# Patient Record
Sex: FEMALE | Race: WHITE | NOT HISPANIC OR LATINO | Employment: FULL TIME | URBAN - METROPOLITAN AREA
[De-identification: names, ages, dates, MRNs, and addresses within clinical notes are randomized per-mention and may not be internally consistent; named-entity substitution may affect disease eponyms.]

---

## 2018-02-05 ENCOUNTER — OFFICE VISIT (OUTPATIENT)
Dept: URGENT CARE | Facility: CLINIC | Age: 38
End: 2018-02-05
Payer: COMMERCIAL

## 2018-02-05 VITALS
TEMPERATURE: 97 F | WEIGHT: 118 LBS | RESPIRATION RATE: 18 BRPM | HEART RATE: 91 BPM | SYSTOLIC BLOOD PRESSURE: 110 MMHG | HEIGHT: 63 IN | DIASTOLIC BLOOD PRESSURE: 62 MMHG | OXYGEN SATURATION: 100 % | BODY MASS INDEX: 20.91 KG/M2

## 2018-02-05 DIAGNOSIS — R05.9 COUGH: ICD-10-CM

## 2018-02-05 DIAGNOSIS — J32.9 RHINOSINUSITIS: Primary | ICD-10-CM

## 2018-02-05 DIAGNOSIS — R10.2 PELVIC PAIN: ICD-10-CM

## 2018-02-05 LAB
BILIRUB UR QL STRIP: NEGATIVE
CTP QC/QA: YES
FLUAV AG NPH QL: NEGATIVE
FLUBV AG NPH QL: NEGATIVE
GLUCOSE UR QL STRIP: NEGATIVE
KETONES UR QL STRIP: NEGATIVE
LEUKOCYTE ESTERASE UR QL STRIP: NEGATIVE
PH, POC UA: 7.5 (ref 5–8)
POC BLOOD, URINE: NEGATIVE
POC NITRATES, URINE: NEGATIVE
PROT UR QL STRIP: NEGATIVE
SP GR UR STRIP: 1.01 (ref 1–1.03)
UROBILINOGEN UR STRIP-ACNC: NORMAL (ref 0.1–1.1)

## 2018-02-05 PROCEDURE — 99203 OFFICE O/P NEW LOW 30 MIN: CPT | Mod: 25,S$GLB,, | Performed by: PHYSICIAN ASSISTANT

## 2018-02-05 PROCEDURE — 81003 URINALYSIS AUTO W/O SCOPE: CPT | Mod: QW,S$GLB,, | Performed by: PHYSICIAN ASSISTANT

## 2018-02-05 PROCEDURE — 87804 INFLUENZA ASSAY W/OPTIC: CPT | Mod: 59,QW,S$GLB, | Performed by: PHYSICIAN ASSISTANT

## 2018-02-05 PROCEDURE — 3008F BODY MASS INDEX DOCD: CPT | Mod: S$GLB,,, | Performed by: PHYSICIAN ASSISTANT

## 2018-02-05 RX ORDER — PREDNISONE 20 MG/1
60 TABLET ORAL DAILY
Qty: 15 TABLET | Refills: 0 | Status: SHIPPED | OUTPATIENT
Start: 2018-02-05 | End: 2018-02-10

## 2018-02-05 RX ORDER — PROMETHAZINE HYDROCHLORIDE AND DEXTROMETHORPHAN HYDROBROMIDE 6.25; 15 MG/5ML; MG/5ML
5 SYRUP ORAL EVERY 6 HOURS PRN
Qty: 120 ML | Refills: 0 | Status: SHIPPED | OUTPATIENT
Start: 2018-02-05 | End: 2018-02-15

## 2018-02-05 NOTE — PROGRESS NOTES
"Subjective:       Patient ID: Irene Payne is a 37 y.o. female.    Vitals:  height is 5' 2.5" (1.588 m) and weight is 53.5 kg (118 lb). Her tympanic temperature is 97.1 °F (36.2 °C). Her blood pressure is 110/62 and her pulse is 91. Her respiration is 18 and oxygen saturation is 100%.     Chief Complaint: URI (4 days ) and Pelvic Pain (1 week left side w/ discharge )    This is a 37 y.o. female with History reviewed. No pertinent past medical history.   who presents today with a chief complaint of uri for 2 months off and on.        URI    This is a new problem. The current episode started more than 1 month ago (2 months off and on ). The problem has been gradually worsening. Associated symptoms include congestion, ear pain (left ), headaches, rhinorrhea and swollen glands. Pertinent negatives include no abdominal pain, chest pain, coughing, nausea, sore throat or wheezing.   Pelvic Pain   The patient's primary symptoms include pelvic pain (left ). This is a new problem. The current episode started in the past 7 days (5 days ). The problem occurs constantly. The problem has been gradually worsening. The pain is moderate. The problem affects the left side. She is not pregnant. Associated symptoms include back pain (upper and lower back ), headaches and urgency. Pertinent negatives include no abdominal pain, chills, fever, nausea or sore throat. Exacerbated by: touch and movement  She has tried acetaminophen for the symptoms. She is sexually active. No, her partner does not have an STD. She uses nothing for contraception. Her menstrual history has been regular.     Review of Systems   Constitution: Positive for malaise/fatigue. Negative for chills and fever.   HENT: Positive for congestion, ear pain (left ), hoarse voice and rhinorrhea. Negative for sore throat.    Eyes: Negative for discharge and redness.   Cardiovascular: Negative for chest pain, dyspnea on exertion and leg swelling.   Respiratory: Negative for " cough, shortness of breath, sputum production and wheezing.    Musculoskeletal: Positive for back pain (upper and lower back ). Negative for myalgias.   Gastrointestinal: Positive for bloating. Negative for abdominal pain and nausea.   Genitourinary: Positive for pelvic pain (left ) and urgency.   Neurological: Positive for dizziness and headaches.       Objective:      Physical Exam   Constitutional: She is oriented to person, place, and time. She appears well-developed and well-nourished.   HENT:   Head: Normocephalic and atraumatic.   Right Ear: Hearing, tympanic membrane, external ear and ear canal normal.   Left Ear: Hearing, tympanic membrane, external ear and ear canal normal.   Nose: Nose normal. Right sinus exhibits no maxillary sinus tenderness and no frontal sinus tenderness. Left sinus exhibits no maxillary sinus tenderness and no frontal sinus tenderness.   Mouth/Throat: Uvula is midline and oropharynx is clear and moist.   Eyes: Conjunctivae are normal.   Neck: Normal range of motion. Neck supple. No thyromegaly present.   Cardiovascular: Normal rate and regular rhythm.  Exam reveals no gallop and no friction rub.    No murmur heard.  Pulmonary/Chest: Effort normal and breath sounds normal. She has no wheezes. She has no rales. She exhibits tenderness (left anterior).   Genitourinary:   Genitourinary Comments: Deferred exam, patient has no concern for STD.   Musculoskeletal: Normal range of motion.        Thoracic back: She exhibits tenderness (left mid/upper back).   Lymphadenopathy:     She has no cervical adenopathy.   Neurological: She is alert and oriented to person, place, and time.   Skin: Skin is warm and dry. No rash noted. No erythema.   Psychiatric: She has a normal mood and affect. Her behavior is normal. Judgment and thought content normal.   Nursing note and vitals reviewed.      3:03 PM - Chest xray show no acute abnormalities.    Assessment:       1. Rhinosinusitis    2. Cough    3.  Pelvic pain        Plan:         Rhinosinusitis    Cough  -     POCT Influenza A/B  -     X-Ray Chest PA And Lateral; Future; Expected date: 02/05/2018  -     predniSONE (DELTASONE) 20 MG tablet; Take 3 tablets (60 mg total) by mouth once daily.  Dispense: 15 tablet; Refill: 0  -     promethazine-dextromethorphan (PROMETHAZINE-DM) 6.25-15 mg/5 mL Syrp; Take 5 mLs by mouth every 6 (six) hours as needed (cough).  Dispense: 120 mL; Refill: 0    Pelvic pain  -     POCT Urinalysis, Dipstick, Automated, W/O Scope      Irene was seen today for uri and pelvic pain.    Diagnoses and all orders for this visit:    Rhinosinusitis    Cough  -     POCT Influenza A/B  -     X-Ray Chest PA And Lateral; Future  -     predniSONE (DELTASONE) 20 MG tablet; Take 3 tablets (60 mg total) by mouth once daily.  -     promethazine-dextromethorphan (PROMETHAZINE-DM) 6.25-15 mg/5 mL Syrp; Take 5 mLs by mouth every 6 (six) hours as needed (cough).    Pelvic pain  -     POCT Urinalysis, Dipstick, Automated, W/O Scope      Patient Instructions   - Rest.    - Drink plenty of fluids.    - Tylenol or Ibuprofen as directed as needed for fever/pain.   - Take over-the-counter claritin, zyrtec, allegra, or xyzal as directed.  - Use over the counter Flonase as directed for sinus congestion and postnasal drip.  - use nasal saline prior to Flonase.  - Antibiotics are not needed at this time.  - Usual course of cold symptom is 10-14 days, but longer if patient is a smoker.   - Use salt water gargle for sore throat.    - Follow up with your PCP or specialty clinic as directed in the next 1-2 weeks if not improved or as needed.  You can call [phone] to schedule an appointment with the appropriate provider.    - Go to the ED if your symptoms worsen.    Sinusitis (No Antibiotics)    The sinuses are air-filled spaces within the bones of the face. They connect to the inside of the nose. Sinusitis is an inflammation of the tissue lining the sinus cavity.  Sinus inflammation can occur during a cold. It can also be due to allergies to pollens and other particles in the air. It can cause symptoms such as sinus congestion, headache, sore throat, facial swelling and fullness. It may also cause a low-grade fever. No infection is present, and no antibiotic treatment is needed.  Home care  · Drink plenty of water, hot tea, and other liquids. This may help thin mucus. It also may promote sinus drainage.  · Heat may help soothe painful areas of the face. Use a towel soaked in hot water. Or,  the shower and direct the hot spray onto your face. Using a vaporizer along with a menthol rub at night may also help.   · An expectorant containing guaifenesin may help thin the mucus and promote drainage from the sinuses.  · Over-the-counter decongestants may be used unless a similar medicine was prescribed. Nasal sprays work the fastest. Use one that contains phenylephrine or oxymetazoline. First blow the nose gently. Then use the spray. Do not use these medicines more often than directed on the label or symptoms may get worse. You may also use tablets containing pseudoephedrine. Avoid products that combine ingredients, because side effects may be increased. Read labels. You can also ask the pharmacist for help. (NOTE: Persons with high blood pressure should not use decongestants. They can raise blood pressure.)  · Over-the-counter antihistamines may help if allergies contributed to your sinusitis.    · Use acetaminophen or ibuprofen to control pain, unless another pain medicine was prescribed. (If you have chronic liver or kidney disease or ever had a stomach ulcer, talk with your doctor before using these medicines. Aspirin should never be used in anyone under 18 years of age who is ill with a fever. It may cause severe liver damage.)  · Use nasal rinses or irrigation as instructed by your health care provider.  · Don't smoke. This can worsen symptoms.  Follow-up care  Follow  up with your healthcare provider or our staff if you are not improving within the next week.  When to seek medical advice  Call your healthcare provider if any of these occur:  · Green or yellow discharge from the nose or into the throat  · Facial pain or headache becoming more severe  · Stiff neck  · Unusual drowsiness or confusion  · Swelling of the forehead or eyelids  · Vision problems, including blurred or double vision  · Fever of 100.4ºF (38ºC) or higher, or as directed by your healthcare provider  · Seizure  · Breathing problems  · Symptoms not resolving within 10 days  Date Last Reviewed: 4/13/2015  © 2766-7355 [employer]. [address] [place] [address], [place] [address]. All rights reserved. This information is not intended as a substitute for professional medical care. Always follow your healthcare professional's instructions.        Viral Upper Respiratory Illness (Adult)  You have a viral upper respiratory illness (URI), which is another term for the common cold. This illness is contagious during the first few days. It is spread through the air by coughing and sneezing. It may also be spread by direct contact (touching the sick person and then touching your own eyes, nose, or mouth). Frequent handwashing will decrease risk of spread. Most viral illnesses go away within 7 to 10 days with rest and simple home remedies. Sometimes the illness may last for several weeks. Antibiotics will not kill a virus, and they are generally not prescribed for this condition.    Home care  · If symptoms are severe, rest at home for the first 2 to 3 days. When you resume activity, don't let yourself get too tired.  · Avoid being exposed to cigarette smoke (yours or others).  · You may use acetaminophen or ibuprofen to control pain and fever, unless another medicine was prescribed. (Note: If you have chronic liver or kidney disease, have ever had a stomach ulcer or gastrointestinal bleeding, or are taking  blood-thinning medicines, talk with your healthcare provider before using these medicines.) Aspirin should never be given to anyone under 18 years of age who is ill with a viral infection or fever. It may cause severe liver or brain damage.  · Your appetite may be poor, so a light diet is fine. Avoid dehydration by drinking 6 to 8 glasses of fluids per day (water, soft drinks, juices, tea, or soup). Extra fluids will help loosen secretions in the nose and lungs.  · Over-the-counter cold medicines will not shorten the length of time youre sick, but they may be helpful for the following symptoms: cough, sore throat, and nasal and sinus congestion. (Note: Do not use decongestants if you have high blood pressure.)  Follow-up care  Follow up with your healthcare provider, or as advised.  When to seek medical advice  Call your healthcare provider right away if any of these occur:  · Cough with lots of colored sputum (mucus)  · Severe headache; face, neck, or ear pain  · Difficulty swallowing due to throat pain  · Fever of 100.4°F (38°C)  Call 911, or get immediate medical care  Call emergency services right away if any of these occur:  · Chest pain, shortness of breath, wheezing, or difficulty breathing  · Coughing up blood  · Inability to swallow due to throat pain  Date Last Reviewed: 9/13/2015  © 1767-8522 [employer]. [address] [place] [address]. All rights reserved. This information is not intended as a substitute for professional medical care. Always follow your healthcare professional's instructions.        Pelvic Pain, Uncertain Cause    Pelvic pain is pain felt in the lowest part of the belly (abdomen) and between the hipbones. The pain may be acute. This means it occurred suddenly and recently. Or the pain may be chronic. This means it has occurred for 6 months or longer.  There are many possible causes of pelvic pain. The pain may be due to a problem in the female reproductive  system (pictured here). Or, it may be due to a problem in the digestive, urinary, or musculoskeletal systems.  Based on your visit today, the exact cause of your pelvic pain is not certain. Your condition does not appear to be serious at this time. But it is important for you to keep watching for any new symptoms or worsening of your condition.  General care  Your healthcare provider may advise a number of ways to help manage your pain. These can include:  · Taking over-the-counter pain medicine. Stronger pain medicine may also be prescribed, if needed.  · Applying heat to the pelvic area. Use a heating pad or a hot pack. Taking a hot bath may also help.  · Getting plenty of rest.  · Making certain lifestyle changes. These can include practicing good posture and getting regular exercise. (Studies have shown that these changes help reduce pelvic pain in some women.)  · Seeing a physical therapist or pain specialist. These healthcare providers can discuss other ways to manage pain with you.  Follow-up care  Follow up with your healthcare provider, or as advised.   When to seek medical advice  Call your healthcare provider right away if any of the following occur:  · Fever of 100.4°F or higher, or as directed by your healthcare provider  · Pain worsens or you have sudden, severe pain or new pain  · Nausea, vomiting, sweating, or restlessness  · Dizziness or fainting  · Unusual vaginal discharge  · Abnormal vaginal bleeding (especially bleeding after menopause)  Date Last Reviewed: 6/11/2015  © 9798-7194 [employer]. [address] [place] [address], [place] [address]. All rights reserved. This information is not intended as a substitute for professional medical care. Always follow your healthcare professional's instructions.

## 2018-02-05 NOTE — PATIENT INSTRUCTIONS
- Rest.    - Drink plenty of fluids.    - Tylenol or Ibuprofen as directed as needed for fever/pain.   - Take over-the-counter claritin, zyrtec, allegra, or xyzal as directed.  - Use over the counter Flonase as directed for sinus congestion and postnasal drip.  - use nasal saline prior to Flonase.  - Antibiotics are not needed at this time.  - Usual course of cold symptom is 10-14 days, but longer if patient is a smoker.   - Use salt water gargle for sore throat.    - Follow up with your PCP or specialty clinic as directed in the next 1-2 weeks if not improved or as needed.  You can call [phone] to schedule an appointment with the appropriate provider.    - Go to the ED if your symptoms worsen.    Sinusitis (No Antibiotics)    The sinuses are air-filled spaces within the bones of the face. They connect to the inside of the nose. Sinusitis is an inflammation of the tissue lining the sinus cavity. Sinus inflammation can occur during a cold. It can also be due to allergies to pollens and other particles in the air. It can cause symptoms such as sinus congestion, headache, sore throat, facial swelling and fullness. It may also cause a low-grade fever. No infection is present, and no antibiotic treatment is needed.  Home care  · Drink plenty of water, hot tea, and other liquids. This may help thin mucus. It also may promote sinus drainage.  · Heat may help soothe painful areas of the face. Use a towel soaked in hot water. Or,  the shower and direct the hot spray onto your face. Using a vaporizer along with a menthol rub at night may also help.   · An expectorant containing guaifenesin may help thin the mucus and promote drainage from the sinuses.  · Over-the-counter decongestants may be used unless a similar medicine was prescribed. Nasal sprays work the fastest. Use one that contains phenylephrine or oxymetazoline. First blow the nose gently. Then use the spray. Do not use these medicines more often than  directed on the label or symptoms may get worse. You may also use tablets containing pseudoephedrine. Avoid products that combine ingredients, because side effects may be increased. Read labels. You can also ask the pharmacist for help. (NOTE: Persons with high blood pressure should not use decongestants. They can raise blood pressure.)  · Over-the-counter antihistamines may help if allergies contributed to your sinusitis.    · Use acetaminophen or ibuprofen to control pain, unless another pain medicine was prescribed. (If you have chronic liver or kidney disease or ever had a stomach ulcer, talk with your doctor before using these medicines. Aspirin should never be used in anyone under 18 years of age who is ill with a fever. It may cause severe liver damage.)  · Use nasal rinses or irrigation as instructed by your health care provider.  · Don't smoke. This can worsen symptoms.  Follow-up care  Follow up with your healthcare provider or our staff if you are not improving within the next week.  When to seek medical advice  Call your healthcare provider if any of these occur:  · Green or yellow discharge from the nose or into the throat  · Facial pain or headache becoming more severe  · Stiff neck  · Unusual drowsiness or confusion  · Swelling of the forehead or eyelids  · Vision problems, including blurred or double vision  · Fever of 100.4ºF (38ºC) or higher, or as directed by your healthcare provider  · Seizure  · Breathing problems  · Symptoms not resolving within 10 days  Date Last Reviewed: 4/13/2015  © 1709-3208 [employer]. [address] [place] [address]. All rights reserved. This information is not intended as a substitute for professional medical care. Always follow your healthcare professional's instructions.        Viral Upper Respiratory Illness (Adult)  You have a viral upper respiratory illness (URI), which is another term for the common cold. This illness is contagious during  the first few days. It is spread through the air by coughing and sneezing. It may also be spread by direct contact (touching the sick person and then touching your own eyes, nose, or mouth). Frequent handwashing will decrease risk of spread. Most viral illnesses go away within 7 to 10 days with rest and simple home remedies. Sometimes the illness may last for several weeks. Antibiotics will not kill a virus, and they are generally not prescribed for this condition.    Home care  · If symptoms are severe, rest at home for the first 2 to 3 days. When you resume activity, don't let yourself get too tired.  · Avoid being exposed to cigarette smoke (yours or others).  · You may use acetaminophen or ibuprofen to control pain and fever, unless another medicine was prescribed. (Note: If you have chronic liver or kidney disease, have ever had a stomach ulcer or gastrointestinal bleeding, or are taking blood-thinning medicines, talk with your healthcare provider before using these medicines.) Aspirin should never be given to anyone under 18 years of age who is ill with a viral infection or fever. It may cause severe liver or brain damage.  · Your appetite may be poor, so a light diet is fine. Avoid dehydration by drinking 6 to 8 glasses of fluids per day (water, soft drinks, juices, tea, or soup). Extra fluids will help loosen secretions in the nose and lungs.  · Over-the-counter cold medicines will not shorten the length of time youre sick, but they may be helpful for the following symptoms: cough, sore throat, and nasal and sinus congestion. (Note: Do not use decongestants if you have high blood pressure.)  Follow-up care  Follow up with your healthcare provider, or as advised.  When to seek medical advice  Call your healthcare provider right away if any of these occur:  · Cough with lots of colored sputum (mucus)  · Severe headache; face, neck, or ear pain  · Difficulty swallowing due to throat pain  · Fever of 100.4°F  (38°C)  Call 911, or get immediate medical care  Call emergency services right away if any of these occur:  · Chest pain, shortness of breath, wheezing, or difficulty breathing  · Coughing up blood  · Inability to swallow due to throat pain  Date Last Reviewed: 9/13/2015  © 3521-9877 [employer]. [address] [place] [address], [place] [address]. All rights reserved. This information is not intended as a substitute for professional medical care. Always follow your healthcare professional's instructions.        Pelvic Pain, Uncertain Cause    Pelvic pain is pain felt in the lowest part of the belly (abdomen) and between the hipbones. The pain may be acute. This means it occurred suddenly and recently. Or the pain may be chronic. This means it has occurred for 6 months or longer.  There are many possible causes of pelvic pain. The pain may be due to a problem in the female reproductive system (pictured here). Or, it may be due to a problem in the digestive, urinary, or musculoskeletal systems.  Based on your visit today, the exact cause of your pelvic pain is not certain. Your condition does not appear to be serious at this time. But it is important for you to keep watching for any new symptoms or worsening of your condition.  General care  Your healthcare provider may advise a number of ways to help manage your pain. These can include:  · Taking over-the-counter pain medicine. Stronger pain medicine may also be prescribed, if needed.  · Applying heat to the pelvic area. Use a heating pad or a hot pack. Taking a hot bath may also help.  · Getting plenty of rest.  · Making certain lifestyle changes. These can include practicing good posture and getting regular exercise. (Studies have shown that these changes help reduce pelvic pain in some women.)  · Seeing a physical therapist or pain specialist. These healthcare providers can discuss other ways to manage pain with you.  Follow-up care  Follow up with your  healthcare provider, or as advised.   When to seek medical advice  Call your healthcare provider right away if any of the following occur:  · Fever of 100.4°F or higher, or as directed by your healthcare provider  · Pain worsens or you have sudden, severe pain or new pain  · Nausea, vomiting, sweating, or restlessness  · Dizziness or fainting  · Unusual vaginal discharge  · Abnormal vaginal bleeding (especially bleeding after menopause)  Date Last Reviewed: 6/11/2015 © 2000-2017 [employer]. [address] [place] [address]. All rights reserved. This information is not intended as a substitute for professional medical care. Always follow your healthcare professional's instructions.

## 2020-06-23 ENCOUNTER — LAB VISIT (OUTPATIENT)
Dept: PRIMARY CARE CLINIC | Facility: OTHER | Age: 40
End: 2020-06-23
Payer: OTHER GOVERNMENT

## 2020-06-23 DIAGNOSIS — R06.02 SHORTNESS OF BREATH: ICD-10-CM

## 2020-06-23 DIAGNOSIS — Z03.818 ENCOUNTER FOR OBSERVATION FOR SUSPECTED EXPOSURE TO OTHER BIOLOGICAL AGENTS RULED OUT: ICD-10-CM

## 2020-06-23 DIAGNOSIS — R51.9 HEAD ACHE: ICD-10-CM

## 2020-06-23 DIAGNOSIS — M79.10 MUSCLE PAIN: ICD-10-CM

## 2020-06-23 DIAGNOSIS — R50.9 FEVER: ICD-10-CM

## 2020-06-23 DIAGNOSIS — R05.9 COUGH: ICD-10-CM

## 2020-06-23 PROCEDURE — U0003 INFECTIOUS AGENT DETECTION BY NUCLEIC ACID (DNA OR RNA); SEVERE ACUTE RESPIRATORY SYNDROME CORONAVIRUS 2 (SARS-COV-2) (CORONAVIRUS DISEASE [COVID-19]), AMPLIFIED PROBE TECHNIQUE, MAKING USE OF HIGH THROUGHPUT TECHNOLOGIES AS DESCRIBED BY CMS-2020-01-R: HCPCS

## 2020-06-26 LAB — SARS-COV-2 RNA RESP QL NAA+PROBE: NOT DETECTED

## 2020-12-10 ENCOUNTER — LAB VISIT (OUTPATIENT)
Dept: PRIMARY CARE CLINIC | Facility: OTHER | Age: 40
End: 2020-12-10
Attending: INTERNAL MEDICINE
Payer: OTHER GOVERNMENT

## 2020-12-10 DIAGNOSIS — R53.83 FATIGUE: ICD-10-CM

## 2020-12-10 DIAGNOSIS — R51.9 HEAD ACHE: ICD-10-CM

## 2020-12-10 DIAGNOSIS — R42 DIZZINESS: ICD-10-CM

## 2020-12-10 DIAGNOSIS — Z03.818 ENCOUNTER FOR OBSERVATION FOR SUSPECTED EXPOSURE TO OTHER BIOLOGICAL AGENTS RULED OUT: ICD-10-CM

## 2020-12-10 PROCEDURE — U0003 INFECTIOUS AGENT DETECTION BY NUCLEIC ACID (DNA OR RNA); SEVERE ACUTE RESPIRATORY SYNDROME CORONAVIRUS 2 (SARS-COV-2) (CORONAVIRUS DISEASE [COVID-19]), AMPLIFIED PROBE TECHNIQUE, MAKING USE OF HIGH THROUGHPUT TECHNOLOGIES AS DESCRIBED BY CMS-2020-01-R: HCPCS

## 2020-12-12 LAB — SARS-COV-2 RNA RESP QL NAA+PROBE: NOT DETECTED

## 2021-01-04 ENCOUNTER — LAB VISIT (OUTPATIENT)
Dept: PRIMARY CARE CLINIC | Facility: OTHER | Age: 41
End: 2021-01-04
Attending: INTERNAL MEDICINE
Payer: OTHER GOVERNMENT

## 2021-01-04 DIAGNOSIS — Z03.818 ENCOUNTER FOR OBSERVATION FOR SUSPECTED EXPOSURE TO OTHER BIOLOGICAL AGENTS RULED OUT: ICD-10-CM

## 2021-01-04 DIAGNOSIS — R51.9 HEAD ACHE: ICD-10-CM

## 2021-01-04 DIAGNOSIS — R53.83 FATIGUE: ICD-10-CM

## 2021-01-04 PROCEDURE — U0003 INFECTIOUS AGENT DETECTION BY NUCLEIC ACID (DNA OR RNA); SEVERE ACUTE RESPIRATORY SYNDROME CORONAVIRUS 2 (SARS-COV-2) (CORONAVIRUS DISEASE [COVID-19]), AMPLIFIED PROBE TECHNIQUE, MAKING USE OF HIGH THROUGHPUT TECHNOLOGIES AS DESCRIBED BY CMS-2020-01-R: HCPCS

## 2021-01-05 LAB — SARS-COV-2 RNA RESP QL NAA+PROBE: NOT DETECTED

## 2021-04-16 ENCOUNTER — PATIENT MESSAGE (OUTPATIENT)
Dept: RESEARCH | Facility: HOSPITAL | Age: 41
End: 2021-04-16

## 2022-11-06 ENCOUNTER — HOSPITAL ENCOUNTER (INPATIENT)
Facility: HOSPITAL | Age: 42
LOS: 2 days | Discharge: HOME | DRG: 871 | End: 2022-11-09
Attending: STUDENT IN AN ORGANIZED HEALTH CARE EDUCATION/TRAINING PROGRAM | Admitting: STUDENT IN AN ORGANIZED HEALTH CARE EDUCATION/TRAINING PROGRAM

## 2022-11-06 DIAGNOSIS — R07.9 CHEST PAIN: ICD-10-CM

## 2022-11-06 DIAGNOSIS — R50.9 FEVER: ICD-10-CM

## 2022-11-06 DIAGNOSIS — J18.9 PNEUMONIA: ICD-10-CM

## 2022-11-06 DIAGNOSIS — J09.X1 INFLUENZA A WITH PNEUMONIA: Primary | ICD-10-CM

## 2022-11-06 PROBLEM — E87.1 HYPONATREMIA: Status: ACTIVE | Noted: 2022-11-06

## 2022-11-06 PROBLEM — F32.A DEPRESSION: Status: ACTIVE | Noted: 2022-11-06

## 2022-11-06 PROBLEM — A41.9 SEPSIS: Status: ACTIVE | Noted: 2022-11-06

## 2022-11-06 LAB
ALBUMIN SERPL BCP-MCNC: 3.5 G/DL (ref 3.5–5.2)
ALP SERPL-CCNC: 66 U/L (ref 55–135)
ALT SERPL W/O P-5'-P-CCNC: 16 U/L (ref 10–44)
ANION GAP SERPL CALC-SCNC: 13 MMOL/L (ref 8–16)
AST SERPL-CCNC: 15 U/L (ref 10–40)
B-HCG UR QL: NEGATIVE
BACTERIA #/AREA URNS AUTO: NORMAL /HPF
BASOPHILS # BLD AUTO: 0.03 K/UL (ref 0–0.2)
BASOPHILS NFR BLD: 0.2 % (ref 0–1.9)
BILIRUB SERPL-MCNC: 0.5 MG/DL (ref 0.1–1)
BILIRUB UR QL STRIP: NEGATIVE
BNP SERPL-MCNC: 17 PG/ML (ref 0–99)
BUN SERPL-MCNC: 7 MG/DL (ref 6–20)
CALCIUM SERPL-MCNC: 8.7 MG/DL (ref 8.7–10.5)
CHLORIDE SERPL-SCNC: 100 MMOL/L (ref 95–110)
CLARITY UR REFRACT.AUTO: CLEAR
CO2 SERPL-SCNC: 20 MMOL/L (ref 23–29)
COLOR UR AUTO: YELLOW
CREAT SERPL-MCNC: 0.7 MG/DL (ref 0.5–1.4)
CTP QC/QA: YES
DIFFERENTIAL METHOD: ABNORMAL
EOSINOPHIL # BLD AUTO: 0 K/UL (ref 0–0.5)
EOSINOPHIL NFR BLD: 0 % (ref 0–8)
ERYTHROCYTE [DISTWIDTH] IN BLOOD BY AUTOMATED COUNT: 11.9 % (ref 11.5–14.5)
EST. GFR  (NO RACE VARIABLE): >60 ML/MIN/1.73 M^2
GLUCOSE SERPL-MCNC: 107 MG/DL (ref 70–110)
GLUCOSE UR QL STRIP: NEGATIVE
GROUP A STREP, MOLECULAR: NEGATIVE
HCT VFR BLD AUTO: 37.6 % (ref 37–48.5)
HCV AB SERPL QL IA: NORMAL
HGB BLD-MCNC: 12.7 G/DL (ref 12–16)
HGB UR QL STRIP: ABNORMAL
HIV 1+2 AB+HIV1 P24 AG SERPL QL IA: NORMAL
IMM GRANULOCYTES # BLD AUTO: 0.07 K/UL (ref 0–0.04)
IMM GRANULOCYTES NFR BLD AUTO: 0.5 % (ref 0–0.5)
INFLUENZA A, MOLECULAR: DETECTED
INFLUENZA B, MOLECULAR: NOT DETECTED
KETONES UR QL STRIP: ABNORMAL
LACTATE SERPL-SCNC: 0.9 MMOL/L (ref 0.5–2.2)
LACTATE SERPL-SCNC: 1 MMOL/L (ref 0.5–2.2)
LEUKOCYTE ESTERASE UR QL STRIP: NEGATIVE
LIPASE SERPL-CCNC: 7 U/L (ref 4–60)
LYMPHOCYTES # BLD AUTO: 0.9 K/UL (ref 1–4.8)
LYMPHOCYTES NFR BLD: 6.9 % (ref 18–48)
MAGNESIUM SERPL-MCNC: 1.4 MG/DL (ref 1.6–2.6)
MCH RBC QN AUTO: 29.4 PG (ref 27–31)
MCHC RBC AUTO-ENTMCNC: 33.8 G/DL (ref 32–36)
MCV RBC AUTO: 87 FL (ref 82–98)
MICROSCOPIC COMMENT: NORMAL
MONOCYTES # BLD AUTO: 1 K/UL (ref 0.3–1)
MONOCYTES NFR BLD: 7.3 % (ref 4–15)
NEUTROPHILS # BLD AUTO: 11.4 K/UL (ref 1.8–7.7)
NEUTROPHILS NFR BLD: 85.1 % (ref 38–73)
NITRITE UR QL STRIP: NEGATIVE
NRBC BLD-RTO: 0 /100 WBC
PH UR STRIP: 6 [PH] (ref 5–8)
PHOSPHATE SERPL-MCNC: 3.1 MG/DL (ref 2.7–4.5)
PLATELET # BLD AUTO: 264 K/UL (ref 150–450)
PMV BLD AUTO: 9.8 FL (ref 9.2–12.9)
POTASSIUM SERPL-SCNC: 3.7 MMOL/L (ref 3.5–5.1)
PROCALCITONIN SERPL IA-MCNC: 0.12 NG/ML
PROT SERPL-MCNC: 7.2 G/DL (ref 6–8.4)
PROT UR QL STRIP: NEGATIVE
RBC # BLD AUTO: 4.32 M/UL (ref 4–5.4)
RBC #/AREA URNS AUTO: 3 /HPF (ref 0–4)
RSV AG BY MOLECULAR METHOD: NOT DETECTED
SARS-COV-2 RNA RESP QL NAA+PROBE: NOT DETECTED
SODIUM SERPL-SCNC: 133 MMOL/L (ref 136–145)
SP GR UR STRIP: 1 (ref 1–1.03)
SQUAMOUS #/AREA URNS AUTO: 4 /HPF
TROPONIN I SERPL DL<=0.01 NG/ML-MCNC: 0.02 NG/ML (ref 0–0.03)
URN SPEC COLLECT METH UR: ABNORMAL
WBC # BLD AUTO: 13.37 K/UL (ref 3.9–12.7)
WBC #/AREA URNS AUTO: 3 /HPF (ref 0–5)

## 2022-11-06 PROCEDURE — 83690 ASSAY OF LIPASE: CPT | Performed by: STUDENT IN AN ORGANIZED HEALTH CARE EDUCATION/TRAINING PROGRAM

## 2022-11-06 PROCEDURE — 0241U SARS-COV2 (COVID) WITH FLU/RSV BY PCR: CPT | Performed by: STUDENT IN AN ORGANIZED HEALTH CARE EDUCATION/TRAINING PROGRAM

## 2022-11-06 PROCEDURE — 81001 URINALYSIS AUTO W/SCOPE: CPT | Performed by: STUDENT IN AN ORGANIZED HEALTH CARE EDUCATION/TRAINING PROGRAM

## 2022-11-06 PROCEDURE — 86803 HEPATITIS C AB TEST: CPT | Performed by: PHYSICIAN ASSISTANT

## 2022-11-06 PROCEDURE — 80053 COMPREHEN METABOLIC PANEL: CPT | Performed by: STUDENT IN AN ORGANIZED HEALTH CARE EDUCATION/TRAINING PROGRAM

## 2022-11-06 PROCEDURE — 81025 URINE PREGNANCY TEST: CPT | Performed by: STUDENT IN AN ORGANIZED HEALTH CARE EDUCATION/TRAINING PROGRAM

## 2022-11-06 PROCEDURE — 63600175 PHARM REV CODE 636 W HCPCS: Performed by: STUDENT IN AN ORGANIZED HEALTH CARE EDUCATION/TRAINING PROGRAM

## 2022-11-06 PROCEDURE — G0378 HOSPITAL OBSERVATION PER HR: HCPCS

## 2022-11-06 PROCEDURE — 99223 PR INITIAL HOSPITAL CARE,LEVL III: ICD-10-PCS | Mod: ,,, | Performed by: STUDENT IN AN ORGANIZED HEALTH CARE EDUCATION/TRAINING PROGRAM

## 2022-11-06 PROCEDURE — 84484 ASSAY OF TROPONIN QUANT: CPT | Performed by: STUDENT IN AN ORGANIZED HEALTH CARE EDUCATION/TRAINING PROGRAM

## 2022-11-06 PROCEDURE — 93005 ELECTROCARDIOGRAM TRACING: CPT

## 2022-11-06 PROCEDURE — 84145 PROCALCITONIN (PCT): CPT | Performed by: STUDENT IN AN ORGANIZED HEALTH CARE EDUCATION/TRAINING PROGRAM

## 2022-11-06 PROCEDURE — 96365 THER/PROPH/DIAG IV INF INIT: CPT | Mod: 59

## 2022-11-06 PROCEDURE — 96361 HYDRATE IV INFUSION ADD-ON: CPT

## 2022-11-06 PROCEDURE — 99291 PR CRITICAL CARE, E/M 30-74 MINUTES: ICD-10-PCS | Mod: CS,,, | Performed by: STUDENT IN AN ORGANIZED HEALTH CARE EDUCATION/TRAINING PROGRAM

## 2022-11-06 PROCEDURE — 96367 TX/PROPH/DG ADDL SEQ IV INF: CPT

## 2022-11-06 PROCEDURE — 87150 DNA/RNA AMPLIFIED PROBE: CPT | Performed by: STUDENT IN AN ORGANIZED HEALTH CARE EDUCATION/TRAINING PROGRAM

## 2022-11-06 PROCEDURE — 87449 NOS EACH ORGANISM AG IA: CPT

## 2022-11-06 PROCEDURE — 25000003 PHARM REV CODE 250: Performed by: STUDENT IN AN ORGANIZED HEALTH CARE EDUCATION/TRAINING PROGRAM

## 2022-11-06 PROCEDURE — 84100 ASSAY OF PHOSPHORUS: CPT | Performed by: STUDENT IN AN ORGANIZED HEALTH CARE EDUCATION/TRAINING PROGRAM

## 2022-11-06 PROCEDURE — 83880 ASSAY OF NATRIURETIC PEPTIDE: CPT | Performed by: STUDENT IN AN ORGANIZED HEALTH CARE EDUCATION/TRAINING PROGRAM

## 2022-11-06 PROCEDURE — 85025 COMPLETE CBC W/AUTO DIFF WBC: CPT | Performed by: STUDENT IN AN ORGANIZED HEALTH CARE EDUCATION/TRAINING PROGRAM

## 2022-11-06 PROCEDURE — 25500020 PHARM REV CODE 255: Performed by: STUDENT IN AN ORGANIZED HEALTH CARE EDUCATION/TRAINING PROGRAM

## 2022-11-06 PROCEDURE — 83735 ASSAY OF MAGNESIUM: CPT | Performed by: STUDENT IN AN ORGANIZED HEALTH CARE EDUCATION/TRAINING PROGRAM

## 2022-11-06 PROCEDURE — 25000003 PHARM REV CODE 250

## 2022-11-06 PROCEDURE — 96375 TX/PRO/DX INJ NEW DRUG ADDON: CPT

## 2022-11-06 PROCEDURE — 99223 1ST HOSP IP/OBS HIGH 75: CPT | Mod: ,,, | Performed by: STUDENT IN AN ORGANIZED HEALTH CARE EDUCATION/TRAINING PROGRAM

## 2022-11-06 PROCEDURE — 96366 THER/PROPH/DIAG IV INF ADDON: CPT

## 2022-11-06 PROCEDURE — 87651 STREP A DNA AMP PROBE: CPT | Performed by: STUDENT IN AN ORGANIZED HEALTH CARE EDUCATION/TRAINING PROGRAM

## 2022-11-06 PROCEDURE — 99291 CRITICAL CARE FIRST HOUR: CPT | Mod: CS,,, | Performed by: STUDENT IN AN ORGANIZED HEALTH CARE EDUCATION/TRAINING PROGRAM

## 2022-11-06 PROCEDURE — 87389 HIV-1 AG W/HIV-1&-2 AB AG IA: CPT | Performed by: PHYSICIAN ASSISTANT

## 2022-11-06 PROCEDURE — 93010 ELECTROCARDIOGRAM REPORT: CPT | Mod: ,,, | Performed by: INTERNAL MEDICINE

## 2022-11-06 PROCEDURE — 93010 EKG 12-LEAD: ICD-10-PCS | Mod: ,,, | Performed by: INTERNAL MEDICINE

## 2022-11-06 PROCEDURE — 83605 ASSAY OF LACTIC ACID: CPT | Performed by: STUDENT IN AN ORGANIZED HEALTH CARE EDUCATION/TRAINING PROGRAM

## 2022-11-06 PROCEDURE — 99285 EMERGENCY DEPT VISIT HI MDM: CPT | Mod: 25

## 2022-11-06 PROCEDURE — 63700000 PHARM REV CODE 250 ALT 637 W/O HCPCS

## 2022-11-06 PROCEDURE — 87040 BLOOD CULTURE FOR BACTERIA: CPT | Mod: 59 | Performed by: STUDENT IN AN ORGANIZED HEALTH CARE EDUCATION/TRAINING PROGRAM

## 2022-11-06 RX ORDER — TALC
6 POWDER (GRAM) TOPICAL NIGHTLY PRN
Status: DISCONTINUED | OUTPATIENT
Start: 2022-11-06 | End: 2022-11-09 | Stop reason: HOSPADM

## 2022-11-06 RX ORDER — ACETAMINOPHEN 325 MG/1
650 TABLET ORAL EVERY 4 HOURS PRN
Status: DISCONTINUED | OUTPATIENT
Start: 2022-11-06 | End: 2022-11-06

## 2022-11-06 RX ORDER — SODIUM CHLORIDE 0.9 % (FLUSH) 0.9 %
10 SYRINGE (ML) INJECTION EVERY 12 HOURS PRN
Status: DISCONTINUED | OUTPATIENT
Start: 2022-11-06 | End: 2022-11-09 | Stop reason: HOSPADM

## 2022-11-06 RX ORDER — PROMETHAZINE HYDROCHLORIDE AND CODEINE PHOSPHATE 6.25; 1 MG/5ML; MG/5ML
5 SOLUTION ORAL
Status: COMPLETED | OUTPATIENT
Start: 2022-11-06 | End: 2022-11-06

## 2022-11-06 RX ORDER — ACETAMINOPHEN 500 MG
1000 TABLET ORAL
Status: COMPLETED | OUTPATIENT
Start: 2022-11-06 | End: 2022-11-06

## 2022-11-06 RX ORDER — BENZONATATE 100 MG/1
100 CAPSULE ORAL 3 TIMES DAILY PRN
Status: DISCONTINUED | OUTPATIENT
Start: 2022-11-06 | End: 2022-11-09 | Stop reason: HOSPADM

## 2022-11-06 RX ORDER — ONDANSETRON 2 MG/ML
4 INJECTION INTRAMUSCULAR; INTRAVENOUS EVERY 8 HOURS PRN
Status: DISCONTINUED | OUTPATIENT
Start: 2022-11-06 | End: 2022-11-09 | Stop reason: HOSPADM

## 2022-11-06 RX ORDER — AZITHROMYCIN 250 MG/1
500 TABLET, FILM COATED ORAL DAILY
Status: COMPLETED | OUTPATIENT
Start: 2022-11-06 | End: 2022-11-08

## 2022-11-06 RX ORDER — MAGNESIUM SULFATE HEPTAHYDRATE 40 MG/ML
2 INJECTION, SOLUTION INTRAVENOUS
Status: COMPLETED | OUTPATIENT
Start: 2022-11-06 | End: 2022-11-06

## 2022-11-06 RX ORDER — OXYCODONE HYDROCHLORIDE 5 MG/1
5 TABLET ORAL EVERY 8 HOURS PRN
Status: DISCONTINUED | OUTPATIENT
Start: 2022-11-06 | End: 2022-11-09 | Stop reason: HOSPADM

## 2022-11-06 RX ORDER — MORPHINE SULFATE 4 MG/ML
4 INJECTION, SOLUTION INTRAMUSCULAR; INTRAVENOUS
Status: COMPLETED | OUTPATIENT
Start: 2022-11-06 | End: 2022-11-06

## 2022-11-06 RX ORDER — HYDROMORPHONE HYDROCHLORIDE 1 MG/ML
1 INJECTION, SOLUTION INTRAMUSCULAR; INTRAVENOUS; SUBCUTANEOUS
Status: DISCONTINUED | OUTPATIENT
Start: 2022-11-06 | End: 2022-11-06

## 2022-11-06 RX ORDER — ACETAMINOPHEN 325 MG/1
650 TABLET ORAL EVERY 4 HOURS PRN
Status: DISCONTINUED | OUTPATIENT
Start: 2022-11-06 | End: 2022-11-09 | Stop reason: HOSPADM

## 2022-11-06 RX ORDER — OSELTAMIVIR PHOSPHATE 75 MG/1
75 CAPSULE ORAL 2 TIMES DAILY
Status: DISCONTINUED | OUTPATIENT
Start: 2022-11-06 | End: 2022-11-08

## 2022-11-06 RX ORDER — IBUPROFEN 200 MG
16 TABLET ORAL
Status: DISCONTINUED | OUTPATIENT
Start: 2022-11-06 | End: 2022-11-09 | Stop reason: HOSPADM

## 2022-11-06 RX ORDER — PROCHLORPERAZINE EDISYLATE 5 MG/ML
5 INJECTION INTRAMUSCULAR; INTRAVENOUS EVERY 6 HOURS PRN
Status: DISCONTINUED | OUTPATIENT
Start: 2022-11-06 | End: 2022-11-06

## 2022-11-06 RX ORDER — PROCHLORPERAZINE MALEATE 5 MG
5 TABLET ORAL 3 TIMES DAILY PRN
Status: DISCONTINUED | OUTPATIENT
Start: 2022-11-06 | End: 2022-11-09 | Stop reason: HOSPADM

## 2022-11-06 RX ORDER — NALOXONE HCL 0.4 MG/ML
0.02 VIAL (ML) INJECTION
Status: DISCONTINUED | OUTPATIENT
Start: 2022-11-06 | End: 2022-11-09 | Stop reason: HOSPADM

## 2022-11-06 RX ORDER — IBUPROFEN 200 MG
24 TABLET ORAL
Status: DISCONTINUED | OUTPATIENT
Start: 2022-11-06 | End: 2022-11-09 | Stop reason: HOSPADM

## 2022-11-06 RX ORDER — VANCOMYCIN HCL IN 5 % DEXTROSE 1G/250ML
1000 PLASTIC BAG, INJECTION (ML) INTRAVENOUS
Status: DISCONTINUED | OUTPATIENT
Start: 2022-11-07 | End: 2022-11-06

## 2022-11-06 RX ORDER — GLUCAGON 1 MG
1 KIT INJECTION
Status: DISCONTINUED | OUTPATIENT
Start: 2022-11-06 | End: 2022-11-09 | Stop reason: HOSPADM

## 2022-11-06 RX ORDER — MAGNESIUM SULFATE 1 G/100ML
1 INJECTION INTRAVENOUS
Status: DISCONTINUED | OUTPATIENT
Start: 2022-11-06 | End: 2022-11-06

## 2022-11-06 RX ADMIN — MAGNESIUM SULFATE 2 G: 2 INJECTION INTRAVENOUS at 04:11

## 2022-11-06 RX ADMIN — PIPERACILLIN SODIUM AND TAZOBACTAM SODIUM 4.5 G: 4; .5 INJECTION, POWDER, LYOPHILIZED, FOR SOLUTION INTRAVENOUS at 12:11

## 2022-11-06 RX ADMIN — VANCOMYCIN HYDROCHLORIDE 1250 MG: 1.25 INJECTION, POWDER, LYOPHILIZED, FOR SOLUTION INTRAVENOUS at 12:11

## 2022-11-06 RX ADMIN — PROMETHAZINE HYDROCHLORIDE AND CODEINE PHOSPHATE 5 ML: 6.25; 1 SOLUTION ORAL at 04:11

## 2022-11-06 RX ADMIN — MORPHINE SULFATE 4 MG: 4 INJECTION, SOLUTION INTRAMUSCULAR; INTRAVENOUS at 11:11

## 2022-11-06 RX ADMIN — SODIUM CHLORIDE, SODIUM LACTATE, POTASSIUM CHLORIDE, AND CALCIUM CHLORIDE 2000 ML: .6; .31; .03; .02 INJECTION, SOLUTION INTRAVENOUS at 12:11

## 2022-11-06 RX ADMIN — AZITHROMYCIN MONOHYDRATE 500 MG: 250 TABLET ORAL at 03:11

## 2022-11-06 RX ADMIN — CEFTRIAXONE 2 G: 2 INJECTION, SOLUTION INTRAVENOUS at 04:11

## 2022-11-06 RX ADMIN — ACETAMINOPHEN 1000 MG: 500 TABLET ORAL at 11:11

## 2022-11-06 RX ADMIN — OXYCODONE 5 MG: 5 TABLET ORAL at 07:11

## 2022-11-06 RX ADMIN — IOHEXOL 75 ML: 350 INJECTION, SOLUTION INTRAVENOUS at 01:11

## 2022-11-06 NOTE — HPI
Ms. Merino is a 42 y.o. F with PMH depression, gestational HTN who presents to the ED with fevers, chills, cough, nausea/vomiting. She reports that on 10/25, she woke with dizziness, fevers/chills, and nausea. She had some associated vomiting and diarrhea. Her symptoms started to improve, but then on Thursday 11/3 she developed cough, pleuritic chest pain, and feeling of 'drowning' in mucus. She also reports a temporal headache that feels like throbbing/stabbing pain when she coughs.    The patient has a son who tested positive for influenza a few days after patient's symptoms started. She reports no sick contacts prior to onset of her symptoms. She denies any episodes of aspiration. She reports having COVID-19 in January 2021. She has a remote 6-pack-year smoking history, quit ~20 years ago. No personal or family history of autoimmune dz or vasculitides.      In the ED, patient is febrile to 102.6, tachycardic to 151, /70, RR 28, saturating 97% on room air. Labs notable for leukocytosis to 13.3, hyponatremia to 133, lactate of 1.0. CXR shows opacity of R cardiophrenic angle. CT A/P shows consolidation of the RLL and an incidental L pleural nodule of 3mm. CTH with no acute abnormality. Patient received 2L LR, 1x vanc/zosyn in the ED. Admitted to medicine for further management.

## 2022-11-06 NOTE — ED NOTES
Pt identifiers Irene LEONARDO were checked and are correct  LOC: The patient is awake, alert, aware of environment with an appropriate affect. Oriented x, speaking appropriately  APPEARANCE: Pt rates all over body aches a 10/10 , in no acute distress, pt is clean and well groomed, clothing properly fastened  SKIN: Skin warm, dry and intact, normal skin turgor, moist mucus membranes  RESPIRATORY: Airway is open and patent, respirations are spontaneous, even and unlabored, normal effort and rate Crackles auscultated to upper and lower lung fields   CARDIAC: Normal rate and rhythm, no peripheral edema noted, capillary refill < 3 seconds, bilateral radial pulses 2+  ABDOMEN: Soft, nontender, nondistended. Bowel sounds present to all four quad of abd on auscultation  NEUROLOGIC: PERRL, facial expression is symmetrical, patient moving all extremities spontaneously, normal sensation in all extremities when touched with a finger.  Follows all commands appropriately  MUSCULOSKELETAL: No obvious deformities.

## 2022-11-06 NOTE — PROGRESS NOTES
Pharmacokinetic Assessment Follow Up: IV Vancomycin    Vancomycin discontinued.  Pharmacy will sign off as consult.  Please re-order consult if vancomycin reinitiated.    Thank you for the consult,   Nate Henry

## 2022-11-06 NOTE — ASSESSMENT & PLAN NOTE
Patient with acute onset productive cough following flu-like illness, associated with N/V/D. RLL consolidation on imaging, leukocytosis, hyponatremia.    -continue broad spectrum abx  -f/u resp and blood cx  -see 'sepsis'

## 2022-11-06 NOTE — SUBJECTIVE & OBJECTIVE
Past Medical History:   Diagnosis Date    Gestational HTN        Past Surgical History:   Procedure Laterality Date    FOOT SURGERY Left        Review of patient's allergies indicates:  No Known Allergies    No current facility-administered medications on file prior to encounter.     No current outpatient medications on file prior to encounter.     Family History       Problem Relation (Age of Onset)    No Known Problems Son          Tobacco Use    Smoking status: Former     Packs/day: 1.00     Years: 6.00     Pack years: 6.00     Types: Cigarettes    Smokeless tobacco: Never   Substance and Sexual Activity    Alcohol use: Yes     Comment: social    Drug use: Never    Sexual activity: Yes     Review of Systems   Constitutional:  Positive for chills, diaphoresis and fever.   HENT:  Positive for congestion, sinus pressure (behind eyes) and sore throat. Negative for rhinorrhea.    Eyes:  Negative for photophobia.   Respiratory:  Positive for cough. Negative for shortness of breath.    Cardiovascular:  Negative for chest pain and leg swelling.   Gastrointestinal:  Positive for diarrhea, nausea and vomiting.   Genitourinary:  Negative for dysuria.   Musculoskeletal:  Positive for myalgias. Negative for arthralgias.   Skin:  Negative for rash.   Neurological:  Positive for headaches. Negative for light-headedness.   Psychiatric/Behavioral:  Negative for agitation and confusion.    Objective:     Vital Signs (Most Recent):  Temp: 99.3 °F (37.4 °C) (11/06/22 1418)  Pulse: 90 (11/06/22 1418)  Resp: (!) 21 (11/06/22 1418)  BP: 112/76 (11/06/22 1418)  SpO2: 97 % (11/06/22 1418)   Vital Signs (24h Range):  Temp:  [99.3 °F (37.4 °C)-102.6 °F (39.2 °C)] 99.3 °F (37.4 °C)  Pulse:  [] 90  Resp:  [15-32] 21  SpO2:  [94 %-97 %] 97 %  BP: (112-157)/(70-85) 112/76     Weight: 63.5 kg (140 lb)  Body mass index is 25.61 kg/m².    Physical Exam  Constitutional:       Appearance: Normal appearance.   HENT:      Head: Normocephalic  and atraumatic.      Nose: Nose normal.      Mouth/Throat:      Mouth: Mucous membranes are moist.      Pharynx: Oropharynx is clear.   Eyes:      Extraocular Movements: Extraocular movements intact.      Conjunctiva/sclera: Conjunctivae normal.   Cardiovascular:      Rate and Rhythm: Normal rate and regular rhythm.      Heart sounds: Normal heart sounds.   Pulmonary:      Effort: Pulmonary effort is normal.      Breath sounds: Wheezing (mild wheezes of FABIEN) present. No rales.   Abdominal:      General: Abdomen is flat. There is no distension.      Palpations: Abdomen is soft.      Tenderness: There is no abdominal tenderness.   Musculoskeletal:         General: No swelling. Normal range of motion.      Cervical back: Normal range of motion.   Skin:     General: Skin is warm and dry.   Neurological:      Mental Status: She is alert and oriented to person, place, and time. Mental status is at baseline.   Psychiatric:         Mood and Affect: Mood normal.         Behavior: Behavior normal.           Significant Labs: All pertinent labs within the past 24 hours have been reviewed.  CBC:   Recent Labs   Lab 11/06/22  1144   WBC 13.37*   HGB 12.7   HCT 37.6        CMP:   Recent Labs   Lab 11/06/22  1144   *   K 3.7      CO2 20*      BUN 7   CREATININE 0.7   CALCIUM 8.7   PROT 7.2   ALBUMIN 3.5   BILITOT 0.5   ALKPHOS 66   AST 15   ALT 16   ANIONGAP 13       Significant Imaging: I have reviewed all pertinent imaging results/findings within the past 24 hours.

## 2022-11-06 NOTE — CONSULTS
Pharmacokinetic Initial Assessment: IV Vancomycin    Assessment/Plan:    -Patient received vancomycin 1250 mg IVPB x1 in the ED  -Initiate vancomycin 1g IVPB Q12H (16 mg/kg, 63 kg)  -Trough prior to 4th dose (including ED dose) on 11/08 @ 0000  -Targeting 15 to 20 for sepsis/PNA    Please contact pharmacy at extension [phone] with any questions regarding this assessment.     Thank you for the consult,   Jaime Bergeron       Patient brief summary:  Irene LEONARDO is a 42 y.o. female initiated on antimicrobial therapy with IV Vancomycin for treatment of suspected sepsis    Drug Allergies:   Review of patient's allergies indicates:  No Known Allergies    Actual Body Weight:   63 kg    Renal Function:   Estimated Creatinine Clearance: 93.6 mL/min (based on SCr of 0.7 mg/dL).,     Dialysis Method (if applicable):  N/A    CBC (last 72 hours):  Recent Labs   Lab Result Units 11/06/22  1144   WBC K/uL 13.37*   Hemoglobin g/dL 12.7   Hematocrit % 37.6   Platelets K/uL 264   Gran % % 85.1*   Lymph % % 6.9*   Mono % % 7.3   Eosinophil % % 0.0   Basophil % % 0.2   Differential Method  Automated       Metabolic Panel (last 72 hours):  Recent Labs   Lab Result Units 11/06/22  1144 11/06/22  1306   Sodium mmol/L 133*  --    Potassium mmol/L 3.7  --    Chloride mmol/L 100  --    CO2 mmol/L 20*  --    Glucose mg/dL 107  --    Glucose, UA   --  Negative   BUN mg/dL 7  --    Creatinine mg/dL 0.7  --    Albumin g/dL 3.5  --    Total Bilirubin mg/dL 0.5  --    Alkaline Phosphatase U/L 66  --    AST U/L 15  --    ALT U/L 16  --    Magnesium mg/dL 1.4*  --    Phosphorus mg/dL 3.1  --        Drug levels (last 3 results):  No results for input(s): VANCOMYCINRA, VANCORANDOM, VANCOMYCINPE, VANCOPEAK, VANCOMYCINTR, VANCOTROUGH in the last 72 hours.    Microbiologic Results:  Microbiology Results (last 7 days)       Procedure Component Value Units Date/Time    Group A Strep, Molecular [[id number]] Collected: 11/06/22 1249    Order Status: Sent  Specimen: Throat Updated: 11/06/22 1333    Blood culture x two cultures. Draw prior to antibiotics. [[id number]] Collected: 11/06/22 1159    Order Status: Sent Specimen: Blood from Peripheral, Antecubital, Right Updated: 11/06/22 1246    Blood culture x two cultures. Draw prior to antibiotics. [[id number]] Collected: 11/06/22 1130    Order Status: Sent Specimen: Blood from Peripheral, Antecubital, Left Updated: 11/06/22 1156

## 2022-11-06 NOTE — PROVIDER PROGRESS NOTES - EMERGENCY DEPT.
Encounter Date: 11/6/2022    ED Physician Progress Notes         EKG - STEMI Decision  Initial Reading: No STEMI present.  Response: No Action Needed.

## 2022-11-06 NOTE — ASSESSMENT & PLAN NOTE
43 yo F presents with acute worsening of flu-like illness characterized by cough, fevers/chills, headache, diarrhea. Leukocytosis to 13.3, hyponatremia to 133, CT A/P with RLL consolidation. S/p fluid resuscitation in ED. S/p vanc/zosyn x1 in ED.    This patient does have evidence of infective focus  My overall impression is sepsis. Vital signs were reviewed and noted in progress note.  Antibiotics given-   Antibiotics (From admission, onward)    Start     Stop Route Frequency Ordered    11/07/22 0100  vancomycin in dextrose 5 % 1 gram/250 mL IVPB 1,000 mg         -- IV Every 12 hours (non-standard times) 11/06/22 1351    11/06/22 1600  cefTRIAXone (ROCEPHIN) 2 g/50 mL D5W IVPB         -- IV Every 24 hours (non-standard times) 11/06/22 1349    11/06/22 1430  azithromycin tablet 500 mg         11/09 0859 Oral Daily 11/06/22 1323    11/06/22 1421  vancomycin - pharmacy to dose  (vancomycin IVPB)        See Hyperspace for full Linked Orders Report.    -- IV pharmacy to manage frequency 11/06/22 1323        Cultures were taken-   Microbiology Results (last 7 days)     Procedure Component Value Units Date/Time    Culture, Respiratory with Gram Stain [[id number]]     Order Status: No result Specimen: Respiratory     Group A Strep, Molecular [[id number]] Collected: 11/06/22 1249    Order Status: Completed Specimen: Throat Updated: 11/06/22 1415     Group A Strep, Molecular Negative     Comment: Arcanobacterium haemolyticum and Beta Streptococcus group C   and G will not be detected by this test method.  Please order   Throat Culture ([id number]) if suspected.         Blood culture x two cultures. Draw prior to antibiotics. [[id number]] Collected: 11/06/22 1159    Order Status: Sent Specimen: Blood from Peripheral, Antecubital, Right Updated: 11/06/22 1246    Blood culture x two cultures. Draw prior to antibiotics. [[id number]] Collected: 11/06/22 1130    Order Status: Sent Specimen: Blood from Peripheral, Antecubital, Left  Updated: 11/06/22 1156        Latest lactate reviewed, they are-  Recent Labs   Lab 11/06/22  1144   LACTATE 1.0       Source- RLL pneumonia    Plan:  -empiric vanc/ctx/azithro for broad spectrum CAP coverage  -blood cx pending  -f/u resp cx  -f/u legionella antigen  -PRN zofran, phenergan for N/V  -PRN tylenol for headaches

## 2022-11-06 NOTE — ASSESSMENT & PLAN NOTE
41 yo F presents with acute worsening of flu-like illness characterized by cough, fevers/chills, headache, diarrhea. Leukocytosis to 13.3, hyponatremia to 133, CT A/P with RLL consolidation. S/p fluid resuscitation in ED. S/p vanc/zosyn x1 in ED. Blood cx with GPC in clusters x 1 bottle    This patient does have evidence of infective focus  My overall impression is sepsis. Vital signs were reviewed and noted in progress note.  Antibiotics given-   Antibiotics (From admission, onward)    Start     Stop Route Frequency Ordered    11/07/22 0100  vancomycin in dextrose 5 % 1 gram/250 mL IVPB 1,000 mg         -- IV Every 12 hours (non-standard times) 11/06/22 1351    11/06/22 1600  cefTRIAXone (ROCEPHIN) 2 g/50 mL D5W IVPB         -- IV Every 24 hours (non-standard times) 11/06/22 1349    11/06/22 1430  azithromycin tablet 500 mg         11/09 0859 Oral Daily 11/06/22 1323    11/06/22 1421  vancomycin - pharmacy to dose  (vancomycin IVPB)        See Hyperspace for full Linked Orders Report.    -- IV pharmacy to manage frequency 11/06/22 1323        Cultures were taken-   Microbiology Results (last 7 days)     Procedure Component Value Units Date/Time    Culture, Respiratory with Gram Stain [[id number]]     Order Status: No result Specimen: Respiratory     Group A Strep, Molecular [[id number]] Collected: 11/06/22 1249    Order Status: Completed Specimen: Throat Updated: 11/06/22 1415     Group A Strep, Molecular Negative     Comment: Arcanobacterium haemolyticum and Beta Streptococcus group C   and G will not be detected by this test method.  Please order   Throat Culture ([id number]) if suspected.         Blood culture x two cultures. Draw prior to antibiotics. [[id number]] Collected: 11/06/22 1159    Order Status: Sent Specimen: Blood from Peripheral, Antecubital, Right Updated: 11/06/22 1246    Blood culture x two cultures. Draw prior to antibiotics. [[id number]] Collected: 11/06/22 1130    Order Status: Sent Specimen: Blood  from Peripheral, Antecubital, Left Updated: 11/06/22 1156        Latest lactate reviewed, they are-  Recent Labs   Lab 11/06/22  1144   LACTATE 1.0       Source- RLL pneumonia    Plan:  -empiric vanc/ctx/azithro for broad spectrum CAP coverage and GPC in blood cx  -repeat blood cx pending  -f/u resp cx  -f/u legionella antigen  -PRN zofran, phenergan for N/V  -PRN tylenol for headaches

## 2022-11-06 NOTE — ASSESSMENT & PLAN NOTE
Mild hyponatremia to 133, patient without overt symptoms. Reports poor PO intake since onset of symptoms. S/p fluid resuscitation in ED.    -daily BMP  -encourage PO intake       Male

## 2022-11-06 NOTE — H&P
[facility] - Emergency Dept  [facility] Medicine  History & Physical    Patient Name: Irene LEONARDO  MRN: [id number]  Patient Class: OP- Observation  Admission Date: 11/6/2022  Attending Physician: Ramón Bennett MD   Primary Care Provider: Primary Doctor No         Patient information was obtained from patient, past medical records and ER records.     Subjective:     Principal Problem:Sepsis    Chief Complaint:   Chief Complaint   Patient presents with    Influenza     Son had flu a, flu symptoms since the 25th        HPI: Ms. Leonardo is a 42 y.o. F with PMH depression, gestational HTN who presents to the ED with fevers, chills, cough, nausea/vomiting. She reports that on 10/25, she woke with dizziness, fevers/chills, and nausea. She had some associated vomiting and diarrhea. Her symptoms started to improve, but then on Thursday 11/3 she developed cough and feeling of 'drowning' in mucus. She also reports a temporal headache that feels like throbbing/stabbing pain when she coughs.    The patient has a son who tested positive for influenza a few days after patient's symptoms started. She reports no sick contacts prior to onset of her symptoms. She denies any episodes of aspiration. She reports having COVID-19 in January 2021. She has a remote 6-pack-year smoking history, quit ~20 years ago.      In the ED, patient is febrile to 102.6, tachycardic to 151, /70, RR 28, saturating 97% on room air. Labs notable for leukocytosis to 13.3, hyponatremia to 133, lactate of 1.0. CXR shows opacity of R cardiophrenic angle. CT A/P shows consolidation of the RLL and an incidental L pleural nodule of 3mm. CTH with no acute abnormality. Patient received 2L LR, 1x vanc/zosyn in the ED. Admitted to medicine for further management.      Past Medical History:   Diagnosis Date    Gestational HTN        Past Surgical History:   Procedure Laterality Date    FOOT SURGERY Left        Review of patient's allergies indicates:  No  Known Allergies    No current facility-administered medications on file prior to encounter.     No current outpatient medications on file prior to encounter.     Family History       Problem Relation (Age of Onset)    No Known Problems Son          Tobacco Use    Smoking status: Former     Packs/day: 1.00     Years: 6.00     Pack years: 6.00     Types: Cigarettes    Smokeless tobacco: Never   Substance and Sexual Activity    Alcohol use: Yes     Comment: social    Drug use: Never    Sexual activity: Yes     Review of Systems   Constitutional:  Positive for chills, diaphoresis and fever.   HENT:  Positive for congestion, sinus pressure (behind eyes) and sore throat. Negative for rhinorrhea.    Eyes:  Negative for photophobia.   Respiratory:  Positive for cough. Negative for shortness of breath.    Cardiovascular:  Negative for chest pain and leg swelling.   Gastrointestinal:  Positive for diarrhea, nausea and vomiting.   Genitourinary:  Negative for dysuria.   Musculoskeletal:  Positive for myalgias. Negative for arthralgias.   Skin:  Negative for rash.   Neurological:  Positive for headaches. Negative for light-headedness.   Psychiatric/Behavioral:  Negative for agitation and confusion.    Objective:     Vital Signs (Most Recent):  Temp: 99.3 °F (37.4 °C) (11/06/22 1418)  Pulse: 90 (11/06/22 1418)  Resp: (!) 21 (11/06/22 1418)  BP: 112/76 (11/06/22 1418)  SpO2: 97 % (11/06/22 1418)   Vital Signs (24h Range):  Temp:  [99.3 °F (37.4 °C)-102.6 °F (39.2 °C)] 99.3 °F (37.4 °C)  Pulse:  [] 90  Resp:  [15-32] 21  SpO2:  [94 %-97 %] 97 %  BP: (112-157)/(70-85) 112/76     Weight: 63.5 kg (140 lb)  Body mass index is 25.61 kg/m².    Physical Exam  Constitutional:       Appearance: Normal appearance.   HENT:      Head: Normocephalic and atraumatic.      Nose: Nose normal.      Mouth/Throat:      Mouth: Mucous membranes are moist.      Pharynx: Oropharynx is clear.   Eyes:      Extraocular Movements: Extraocular  movements intact.      Conjunctiva/sclera: Conjunctivae normal.   Cardiovascular:      Rate and Rhythm: Normal rate and regular rhythm.      Heart sounds: Normal heart sounds.   Pulmonary:      Effort: Pulmonary effort is normal.      Breath sounds: Wheezing (mild wheezes of FABIEN) present. No rales.   Abdominal:      General: Abdomen is flat. There is no distension.      Palpations: Abdomen is soft.      Tenderness: There is no abdominal tenderness.   Musculoskeletal:         General: No swelling. Normal range of motion.      Cervical back: Normal range of motion.   Skin:     General: Skin is warm and dry.   Neurological:      Mental Status: She is alert and oriented to person, place, and time. Mental status is at baseline.   Psychiatric:         Mood and Affect: Mood normal.         Behavior: Behavior normal.           Significant Labs: All pertinent labs within the past 24 hours have been reviewed.  CBC:   Recent Labs   Lab 11/06/22  1144   WBC 13.37*   HGB 12.7   HCT 37.6        CMP:   Recent Labs   Lab 11/06/22  1144   *   K 3.7      CO2 20*      BUN 7   CREATININE 0.7   CALCIUM 8.7   PROT 7.2   ALBUMIN 3.5   BILITOT 0.5   ALKPHOS 66   AST 15   ALT 16   ANIONGAP 13       Significant Imaging: I have reviewed all pertinent imaging results/findings within the past 24 hours.    Assessment/Plan:     Hyponatremia  Mild hyponatremia to 133, patient without overt symptoms. Reports poor PO intake since onset of symptoms. S/p fluid resuscitation in ED.    -daily BMP  -encourage PO intake        Pneumonia  Patient with acute onset productive cough following flu-like illness, associated with N/V/D. RLL consolidation on imaging, leukocytosis, hyponatremia.    -continue broad spectrum abx  -f/u resp and blood cx  -see 'sepsis'      Sepsis  43 yo F presents with acute worsening of flu-like illness characterized by cough, fevers/chills, headache, diarrhea. Leukocytosis to 13.3, hyponatremia to 133, CT  A/P with RLL consolidation. S/p fluid resuscitation in ED. S/p vanc/zosyn x1 in ED.    This patient does have evidence of infective focus  My overall impression is sepsis. Vital signs were reviewed and noted in progress note.  Antibiotics given-   Antibiotics (From admission, onward)    Start     Stop Route Frequency Ordered    11/07/22 0100  vancomycin in dextrose 5 % 1 gram/250 mL IVPB 1,000 mg         -- IV Every 12 hours (non-standard times) 11/06/22 1351    11/06/22 1600  cefTRIAXone (ROCEPHIN) 2 g/50 mL D5W IVPB         -- IV Every 24 hours (non-standard times) 11/06/22 1349    11/06/22 1430  azithromycin tablet 500 mg         11/09 0859 Oral Daily 11/06/22 1323    11/06/22 1421  vancomycin - pharmacy to dose  (vancomycin IVPB)        See Hyperspace for full Linked Orders Report.    -- IV pharmacy to manage frequency 11/06/22 1323        Cultures were taken-   Microbiology Results (last 7 days)     Procedure Component Value Units Date/Time    Culture, Respiratory with Gram Stain [[id number]]     Order Status: No result Specimen: Respiratory     Group A Strep, Molecular [[id number]] Collected: 11/06/22 1249    Order Status: Completed Specimen: Throat Updated: 11/06/22 1415     Group A Strep, Molecular Negative     Comment: Arcanobacterium haemolyticum and Beta Streptococcus group C   and G will not be detected by this test method.  Please order   Throat Culture ([id number]) if suspected.         Blood culture x two cultures. Draw prior to antibiotics. [[id number]] Collected: 11/06/22 1159    Order Status: Sent Specimen: Blood from Peripheral, Antecubital, Right Updated: 11/06/22 1246    Blood culture x two cultures. Draw prior to antibiotics. [[id number]] Collected: 11/06/22 1130    Order Status: Sent Specimen: Blood from Peripheral, Antecubital, Left Updated: 11/06/22 1156        Latest lactate reviewed, they are-  Recent Labs   Lab 11/06/22  1144   LACTATE 1.0       Source- RLL pneumonia    Plan:  -empiric  vanc/ctx/azithro for broad spectrum CAP coverage  -blood cx pending  -f/u resp cx  -f/u legionella antigen  -PRN zofran, phenergan for N/V  -PRN tylenol for headaches        VTE Risk Mitigation (From admission, onward)         Ordered     IP VTE LOW RISK PATIENT  Once         11/06/22 1320     Place sequential compression device  Until discontinued         11/06/22 1320                   SANDIP HURD MD  Department of Hospital Medicine   [facility] - Emergency Dept

## 2022-11-06 NOTE — ED PROVIDER NOTES
Encounter Date: 11/6/2022       History     Chief Complaint   Patient presents with    Influenza     Son had flu a, flu symptoms since the 25th      is a previously healthy 42-year-old female who presents to the emergency department due to multiple concerns.  Patient states that for the past week she has had cough and congestion. She states that her son had the flu around the same time so she just assumed that was what it was but she states that over the past 4 days all of her symptoms have worsened. She states that she has had constant fevers she has also felt that her heart was racing. Additionally patient states that she has been extremely fatigued, she states that her cough,congestion and sore throat has worsened.  Patient states that she has also had loss of appetite and generalized abdominal pain. She was concerned by her symptoms and so she came to the emergency department for evaluation. She denies any medical history except that she states she had very bad COVID.     The history is provided by the patient and the spouse.   Review of patient's allergies indicates:  No Known Allergies  Past Medical History:   Diagnosis Date    Gestational HTN      Past Surgical History:   Procedure Laterality Date    FOOT SURGERY Left      Family History   Problem Relation Age of Onset    No Known Problems Son      Social History     Tobacco Use    Smoking status: Former     Packs/day: 1.00     Years: 6.00     Pack years: 6.00     Types: Cigarettes    Smokeless tobacco: Never   Substance Use Topics    Alcohol use: Yes     Comment: social    Drug use: Never     Review of Systems   Constitutional:  Positive for fatigue and fever. Negative for chills and diaphoresis.   HENT:  Positive for congestion, rhinorrhea, sinus pressure and sore throat.    Eyes:  Negative for visual disturbance.   Respiratory:  Negative for cough, chest tightness and shortness of breath.    Cardiovascular:  Negative for chest pain.    Gastrointestinal:  Negative for abdominal pain, blood in stool, constipation, diarrhea and vomiting.   Genitourinary:  Negative for dysuria, hematuria and urgency.   Musculoskeletal:  Positive for arthralgias and myalgias. Negative for back pain.   Skin:  Negative for rash.   Neurological:  Positive for headaches. Negative for seizures and syncope.   Hematological:  Does not bruise/bleed easily.   Psychiatric/Behavioral:  Negative for agitation and hallucinations.      Physical Exam     Initial Vitals [11/06/22 1041]   BP Pulse Resp Temp SpO2   (!) 157/70 (!) 151 (!) 28 (!) 102.6 °F (39.2 °C) 97 %      MAP       --         Physical Exam     Nursing note and vitals reviewed.  Constitutional:  Distressed patient crying during exam  HENT:   Head: Normocephalic and atraumatic.   Eyes: Conjunctivae and EOM are normal. Pupils are equal, round, and reactive to light. no scleral icterus, no periorbital edema or ecchymosis  Neck: Neck supple. no stridor, no masses, no drooling or voice changes  Normal range of motion.  Cardiovascular: Tachycardia, regular rhythm, normal heart sounds and intact distal pulses. no m/r/g  Pulmonary/Chest: Breath sounds normal. CTAB, no wheezes, rales or rhonchi, no increased work of breathing  Abdominal: Abdomen is soft. Patient exhibits no distension. Generalized abdominal tenderness. no organomegaly, no CVAT  Musculoskeletal:      Cervical back: Normal range of motion and neck supple.   Neurological: Patient is alert and oriented to person, place, and time. No cranial nerve deficit. Gait normal. GCS score is 15. Moving all extremities, gait intact, face grossly symmetric  Skin: Skin is warm and dry.  Ext: no edema, no lesions, rashes, or deformity  Psych: Normal mood/affect,cooperative, well groomed, makes good eye contact        ED Course   Procedures  Labs Reviewed   SARS-COV2 (COVID) WITH FLU/RSV BY PCR - Abnormal; Notable for the following components:       Result Value    Influenza A,  Molecular Detected (*)     All other components within normal limits   CBC W/ AUTO DIFFERENTIAL - Abnormal; Notable for the following components:    WBC 13.37 (*)     Gran # (ANC) 11.4 (*)     Immature Grans (Abs) 0.07 (*)     Lymph # 0.9 (*)     Gran % 85.1 (*)     Lymph % 6.9 (*)     All other components within normal limits   COMPREHENSIVE METABOLIC PANEL - Abnormal; Notable for the following components:    Sodium 133 (*)     CO2 20 (*)     All other components within normal limits   URINALYSIS, REFLEX TO URINE CULTURE - Abnormal; Notable for the following components:    Ketones, UA Trace (*)     Occult Blood UA 1+ (*)     All other components within normal limits    Narrative:     Specimen Source->Urine   MAGNESIUM - Abnormal; Notable for the following components:    Magnesium 1.4 (*)     All other components within normal limits   POCT URINE PREGNANCY - Normal   CULTURE, BLOOD    Narrative:     Aerobic and anaerobic   GROUP A STREP, MOLECULAR   CULTURE, BLOOD   CULTURE, RESPIRATORY   HIV 1 / 2 ANTIBODY    Narrative:     Release to patient->Immediate   HEPATITIS C ANTIBODY    Narrative:     Release to patient->Immediate   LACTIC ACID, PLASMA   LACTIC ACID, PLASMA   PHOSPHORUS   TROPONIN I   B-TYPE NATRIURETIC PEPTIDE   LIPASE   URINALYSIS MICROSCOPIC    Narrative:     Specimen Source->Urine   PROCALCITONIN   LEGIONELLA ANTIGEN, URINE RANDOM   CULTURE, LEGIONELLA   STREPTOCOCCUS PNEUMONIAE AB IGG (23 SEROTYPES)   POCT RAPID STREP A     EKG Readings: (Independently Interpreted)   Initial Reading: No STEMI. Rhythm: Sinus Tachycardia. Heart Rate: 150.     Imaging Results              CT Abdomen Pelvis With Contrast (Final result)  Result time 11/06/22 14:08:04      Final result by Cliff Das MD (11/06/22 14:08:04)                   Impression:      Right lower lobe consolidation.  Pneumonia versus atelectasis.  Follow-up to resolution recommended.    0.3 cm left pleural based nodule.  For a solid nodule <6  mm, Fleischner Society 2017 guidelines recommend no routine follow up for a low risk patient, or follow-up with non-contrast chest CT at 12 months in a high risk patient.    Electronically signed by resident: Silverio Ball  Date:    11/06/2022  Time:    13:53    Electronically signed by: Cliff Das MD  Date:    11/06/2022  Time:    14:08               Narrative:    EXAMINATION:  CT ABDOMEN PELVIS WITH CONTRAST    CLINICAL HISTORY:  Abdominal pain, acute, nonlocalized;    TECHNIQUE:  Axial images of the abdomen and pelvis were acquired after the use of 75 cc [id number] IV contrast .  Coronal and sagittal reconstructions were also obtained    COMPARISON:  Chest radiograph 11/06/2022    FINDINGS:  HEART: Normal in size. No pericardial effusion.    LUNG BASES: Right lower lobe consolidation.  0.3 cm left pleural based nodule.    LIVER: Size measuring 18.8 cm.  No focal hepatic lesion.    GALLBLADDER: No calcified gallstones.    BILE DUCTS: No evidence of dilated ducts.    PANCREAS: No mass or peripancreatic fat stranding.    SPLEEN: Unremarkable.    ADRENALS: Unremarkable.    KIDNEYS/URETERS: Normal in size and location. Normal enhancement. No hydronephrosis or nephrolithiasis. No ureteral dilatation.    BLADDER: No evidence of wall thickening.    REPRODUCTIVE: Prominent endometrium, likely related to menstruation.    STOMACH/DUODENUM: Unremarkable.    BOWEL/MESENTRY: Small bowel is normal in caliber with no evidence of obstruction. No evidence of inflammation or wall thickening.  Colon demonstrates no focal wall thickening.  Appendix is unremarkable.    PERITONEUM: No intraperitoneal free air or fluid    LYMPN NODES: No retroperitoneal lymphadenopathy.    VESSELS: No aneurysm. No significant calcific atherosclerosis.    ABDOMINAL WALL:  Unremarkable.    BONES: No acute fracture. No suspicious osseous lesions.                                       CT Head Without Contrast (Final result)  Result time 11/06/22  13:45:44      Final result by Nicola Herrera MD (11/06/22 13:45:44)                   Impression:      No acute intracranial findings.      Electronically signed by: Nicola Herrera  Date:    11/06/2022  Time:    13:45               Narrative:    EXAMINATION:  CT HEAD WITHOUT CONTRAST    CLINICAL HISTORY:  Headache, sudden, severe;    TECHNIQUE:  Low dose axial images were obtained through the head.  Coronal and sagittal reformations were also performed. Contrast was not administered.    COMPARISON:  None.    FINDINGS:  Blood: No acute intracranial hemorrhage.    Parenchyma: No definite loss of gray-white differentiation to suggest acute or subacute transcortical infarct.    Ventricles/Extra-axial spaces: No abnormal extra-axial fluid collection. Basal cisterns are patent.    Vessels: No significant abnormality by noncontrast technique.    Orbits: Unremarkable.    Scalp: Unremarkable.    Skull: There are no depressed skull fractures or destructive bone lesions.    Sinuses and mastoids: Scattered paranasal sinus mucosal thickening with retention cysts.    Other findings: None                                       X-Ray Chest AP Portable (Final result)  Result time 11/06/22 12:06:35      Final result by Cliff Das MD (11/06/22 12:06:35)                   Impression:      Opacity RIGHT cardiophrenic angle may be developing pneumonia.  Follow-up recommended.      Electronically signed by: Cliff Das MD  Date:    11/06/2022  Time:    12:06               Narrative:    EXAMINATION:  XR CHEST AP PORTABLE    CLINICAL HISTORY:  Sepsis;    TECHNIQUE:  Single frontal view of the chest was performed.    COMPARISON:  None    FINDINGS:  Mild consolidative change RIGHT the cat angle may be that of an area of developing pneumonia.  No pleural effusion. No evident pneumothorax.    The cardiac silhouette is normal in size. The hilar and mediastinal contours are unremarkable.    Bones are intact.                                        Medications   sodium chloride 0.9% flush 10 mL (has no administration in time range)   naloxone 0.4 mg/mL injection 0.02 mg (has no administration in time range)   glucose chewable tablet 16 g (has no administration in time range)   glucose chewable tablet 24 g (has no administration in time range)   glucagon (human recombinant) injection 1 mg (has no administration in time range)   dextrose 10% bolus 125 mL (has no administration in time range)   dextrose 10% bolus 250 mL (has no administration in time range)   ondansetron injection 4 mg (has no administration in time range)   melatonin tablet 6 mg (has no administration in time range)   azithromycin tablet 500 mg (500 mg Oral Given 11/6/22 1554)   cefTRIAXone (ROCEPHIN) 2 g/50 mL D5W IVPB (0 g Intravenous Stopped 11/6/22 1726)   benzonatate capsule 100 mg (has no administration in time range)   acetaminophen tablet 650 mg (has no administration in time range)   oxyCODONE immediate release tablet 5 mg (has no administration in time range)   prochlorperazine tablet 5 mg (has no administration in time range)   oseltamivir capsule 75 mg (has no administration in time range)   lactated ringers bolus 1,905 mL (0 mLs Intravenous Stopped 11/6/22 1302)   acetaminophen tablet 1,000 mg (1,000 mg Oral Given 11/6/22 1149)   morphine injection 4 mg (4 mg Intravenous Given 11/6/22 1152)   piperacillin-tazobactam 4.5 g in sodium chloride 0.9% 100 mL IVPB (ready to mix system) (0 g Intravenous Stopped 11/6/22 1253)   vancomycin 1.25 g in dextrose 5% 250 mL IVPB (ready to mix) (0 mg Intravenous Stopped 11/6/22 1428)   magnesium sulfate 2g in water 50mL IVPB (premix) (0 g Intravenous Stopped 11/6/22 1823)   promethazine-codeine 6.25-10 mg/5 ml syrup 5 mL (5 mLs Oral Given 11/6/22 1615)   iohexoL (OMNIPAQUE 350) injection 75 mL (75 mLs Intravenous Given 11/6/22 1341)     Medical Decision Making:   Initial Assessment:    is a previously healthy 42-year-old  female who presents to the emergency department due to multiple concerns.    Differential Diagnosis:   Sepsis  Shock   Pneumonia  UTI  Intra-abdominal Abscess      Clinical Tests:   Lab Tests: Ordered and Reviewed  Radiological Study: Ordered and Reviewed  ED Management:  Patient was examined, given her presentation and vital signs. I was concerned for sepsis.   Labs were ordered including:  CBC which was significant for leukocytosis  CMP was significant for decreased sodium and bicarb  EKG and troponin were within normal limits  Blood cultures and urine cultures were obtained and sent.   Chest x-ray showed a RLL pneumonia  CT scan did not show any significant findings.   Lactate was not elevated  She was given fluids and covered with broad-spectrum antibiotics Vancomycin and Zosyn.   Discussion was had a medicine and patient was admitted for further workup of sepsis.                ED Course as of 11/06/22 1920   [place] Nov 06, 2022   1259 CT Head Without Contrast [OO]   1414 SARS-Cov2 (COVID) with FLU/RSV by PCR [WB]   1414 Group A Strep, Molecular [WB]      ED Course User Index  [OO] Naomi Padron MD  [WB] Dawson Francisco MD                 Clinical Impression:   Final diagnoses:  [R50.9] Fever  [J18.9] Pneumonia        ED Disposition Condition    Observation                 Naomi Padron MD  Resident  11/06/22 1920

## 2022-11-07 LAB
ANION GAP SERPL CALC-SCNC: 8 MMOL/L (ref 8–16)
BASOPHILS # BLD AUTO: 0.03 K/UL (ref 0–0.2)
BASOPHILS NFR BLD: 0.2 % (ref 0–1.9)
BUN SERPL-MCNC: 5 MG/DL (ref 6–20)
CALCIUM SERPL-MCNC: 8.9 MG/DL (ref 8.7–10.5)
CHLORIDE SERPL-SCNC: 103 MMOL/L (ref 95–110)
CO2 SERPL-SCNC: 26 MMOL/L (ref 23–29)
CREAT SERPL-MCNC: 0.6 MG/DL (ref 0.5–1.4)
DIFFERENTIAL METHOD: ABNORMAL
EOSINOPHIL # BLD AUTO: 0.1 K/UL (ref 0–0.5)
EOSINOPHIL NFR BLD: 0.4 % (ref 0–8)
ERYTHROCYTE [DISTWIDTH] IN BLOOD BY AUTOMATED COUNT: 12.2 % (ref 11.5–14.5)
EST. GFR  (NO RACE VARIABLE): >60 ML/MIN/1.73 M^2
GLUCOSE SERPL-MCNC: 88 MG/DL (ref 70–110)
HCT VFR BLD AUTO: 34 % (ref 37–48.5)
HGB BLD-MCNC: 11.4 G/DL (ref 12–16)
IMM GRANULOCYTES # BLD AUTO: 0.05 K/UL (ref 0–0.04)
IMM GRANULOCYTES NFR BLD AUTO: 0.4 % (ref 0–0.5)
LYMPHOCYTES # BLD AUTO: 1.9 K/UL (ref 1–4.8)
LYMPHOCYTES NFR BLD: 14.9 % (ref 18–48)
MAGNESIUM SERPL-MCNC: 2 MG/DL (ref 1.6–2.6)
MCH RBC QN AUTO: 29.8 PG (ref 27–31)
MCHC RBC AUTO-ENTMCNC: 33.5 G/DL (ref 32–36)
MCV RBC AUTO: 89 FL (ref 82–98)
MONOCYTES # BLD AUTO: 0.8 K/UL (ref 0.3–1)
MONOCYTES NFR BLD: 6.8 % (ref 4–15)
MRSA ID BY PCR: NEGATIVE
NEUTROPHILS # BLD AUTO: 9.6 K/UL (ref 1.8–7.7)
NEUTROPHILS NFR BLD: 77.3 % (ref 38–73)
NRBC BLD-RTO: 0 /100 WBC
PHOSPHATE SERPL-MCNC: 3.1 MG/DL (ref 2.7–4.5)
PLATELET # BLD AUTO: 263 K/UL (ref 150–450)
PMV BLD AUTO: 10.4 FL (ref 9.2–12.9)
POTASSIUM SERPL-SCNC: 3.7 MMOL/L (ref 3.5–5.1)
RBC # BLD AUTO: 3.83 M/UL (ref 4–5.4)
SODIUM SERPL-SCNC: 137 MMOL/L (ref 136–145)
STAPH AUREUS ID BY PCR: NEGATIVE
WBC # BLD AUTO: 12.38 K/UL (ref 3.9–12.7)

## 2022-11-07 PROCEDURE — 83735 ASSAY OF MAGNESIUM: CPT

## 2022-11-07 PROCEDURE — 99900035 HC TECH TIME PER 15 MIN (STAT)

## 2022-11-07 PROCEDURE — 25000003 PHARM REV CODE 250: Performed by: STUDENT IN AN ORGANIZED HEALTH CARE EDUCATION/TRAINING PROGRAM

## 2022-11-07 PROCEDURE — 96366 THER/PROPH/DIAG IV INF ADDON: CPT

## 2022-11-07 PROCEDURE — 11000001 HC ACUTE MED/SURG PRIVATE ROOM

## 2022-11-07 PROCEDURE — 87040 BLOOD CULTURE FOR BACTERIA: CPT

## 2022-11-07 PROCEDURE — 84100 ASSAY OF PHOSPHORUS: CPT

## 2022-11-07 PROCEDURE — 25000003 PHARM REV CODE 250: Performed by: INTERNAL MEDICINE

## 2022-11-07 PROCEDURE — 36415 COLL VENOUS BLD VENIPUNCTURE: CPT

## 2022-11-07 PROCEDURE — 94667 MNPJ CHEST WALL 1ST: CPT

## 2022-11-07 PROCEDURE — 94799 UNLISTED PULMONARY SVC/PX: CPT

## 2022-11-07 PROCEDURE — 63600175 PHARM REV CODE 636 W HCPCS: Performed by: STUDENT IN AN ORGANIZED HEALTH CARE EDUCATION/TRAINING PROGRAM

## 2022-11-07 PROCEDURE — 94664 DEMO&/EVAL PT USE INHALER: CPT

## 2022-11-07 PROCEDURE — 99233 SBSQ HOSP IP/OBS HIGH 50: CPT | Mod: ,,, | Performed by: STUDENT IN AN ORGANIZED HEALTH CARE EDUCATION/TRAINING PROGRAM

## 2022-11-07 PROCEDURE — 27000207 HC ISOLATION

## 2022-11-07 PROCEDURE — 94761 N-INVAS EAR/PLS OXIMETRY MLT: CPT

## 2022-11-07 PROCEDURE — 85025 COMPLETE CBC W/AUTO DIFF WBC: CPT

## 2022-11-07 PROCEDURE — 27000646 HC AEROBIKA DEVICE

## 2022-11-07 PROCEDURE — 80048 BASIC METABOLIC PNL TOTAL CA: CPT

## 2022-11-07 PROCEDURE — 63700000 PHARM REV CODE 250 ALT 637 W/O HCPCS

## 2022-11-07 PROCEDURE — 99233 PR SUBSEQUENT HOSPITAL CARE,LEVL III: ICD-10-PCS | Mod: ,,, | Performed by: STUDENT IN AN ORGANIZED HEALTH CARE EDUCATION/TRAINING PROGRAM

## 2022-11-07 PROCEDURE — 25000003 PHARM REV CODE 250

## 2022-11-07 RX ORDER — VANCOMYCIN HCL IN 5 % DEXTROSE 1G/250ML
15 PLASTIC BAG, INJECTION (ML) INTRAVENOUS
Status: DISCONTINUED | OUTPATIENT
Start: 2022-11-07 | End: 2022-11-08

## 2022-11-07 RX ADMIN — AZITHROMYCIN MONOHYDRATE 500 MG: 250 TABLET ORAL at 08:11

## 2022-11-07 RX ADMIN — VANCOMYCIN HYDROCHLORIDE 1000 MG: 1 INJECTION, POWDER, LYOPHILIZED, FOR SOLUTION INTRAVENOUS at 11:11

## 2022-11-07 RX ADMIN — GUAIFENESIN AND DEXTROMETHORPHAN HYDROBROMIDE 1 TABLET: 600; 30 TABLET, EXTENDED RELEASE ORAL at 08:11

## 2022-11-07 RX ADMIN — OXYCODONE 5 MG: 5 TABLET ORAL at 08:11

## 2022-11-07 RX ADMIN — CEFTRIAXONE 2 G: 2 INJECTION, SOLUTION INTRAVENOUS at 03:11

## 2022-11-07 RX ADMIN — ACETAMINOPHEN 650 MG: 325 TABLET ORAL at 08:11

## 2022-11-07 RX ADMIN — Medication 6 MG: at 12:11

## 2022-11-07 RX ADMIN — BENZONATATE 100 MG: 100 CAPSULE ORAL at 08:11

## 2022-11-07 RX ADMIN — ACETAMINOPHEN 650 MG: 325 TABLET ORAL at 03:11

## 2022-11-07 RX ADMIN — Medication 6 MG: at 08:11

## 2022-11-07 RX ADMIN — BENZONATATE 100 MG: 100 CAPSULE ORAL at 03:11

## 2022-11-07 RX ADMIN — ACETAMINOPHEN 650 MG: 325 TABLET ORAL at 12:11

## 2022-11-07 RX ADMIN — GUAIFENESIN AND DEXTROMETHORPHAN HYDROBROMIDE 1 TABLET: 600; 30 TABLET, EXTENDED RELEASE ORAL at 12:11

## 2022-11-07 NOTE — CONSULTS
Pharmacokinetic Initial Assessment: IV Vancomycin    Assessment/Plan:    Received vancomycin 1250 mg (20 mg/kg) IV x 1 @ 1300 11/6.      Vancomycin 1000 mg (15 mg/kg) IV q12h  Trough @ 1030 on 11/8  Goal trough = 15-20 mcg/mL        Thank you for the consult, will continue to follow  Hilario Romano.D., BCPS  [id number]       Patient brief summary:  Irene LEONARDO is a 42 y.o. female initiated on antimicrobial therapy with IV Vancomycin for treatment of GPC bacteremia     Drug Allergies:   Review of patient's allergies indicates:  No Known Allergies    Actual Body Weight:   63.5 kg    Renal Function:   Estimated Creatinine Clearance: 109.2 mL/min (based on SCr of 0.6 mg/dL).,       CBC (last 72 hours):  Recent Labs   Lab Result Units 11/06/22  1144 11/07/22  0353   WBC K/uL 13.37* 12.38   Hemoglobin g/dL 12.7 11.4*   Hematocrit % 37.6 34.0*   Platelets K/uL 264 263   Gran % % 85.1* 77.3*   Lymph % % 6.9* 14.9*   Mono % % 7.3 6.8   Eosinophil % % 0.0 0.4   Basophil % % 0.2 0.2   Differential Method  Automated Automated       Metabolic Panel (last 72 hours):  Recent Labs   Lab Result Units 11/06/22  1144 11/06/22  1306 11/07/22  0353   Sodium mmol/L 133*  --  137   Potassium mmol/L 3.7  --  3.7   Chloride mmol/L 100  --  103   CO2 mmol/L 20*  --  26   Glucose mg/dL 107  --  88   Glucose, UA   --  Negative  --    BUN mg/dL 7  --  5*   Creatinine mg/dL 0.7  --  0.6   Albumin g/dL 3.5  --   --    Total Bilirubin mg/dL 0.5  --   --    Alkaline Phosphatase U/L 66  --   --    AST U/L 15  --   --    ALT U/L 16  --   --    Magnesium mg/dL 1.4*  --  2.0   Phosphorus mg/dL 3.1  --  3.1       Drug levels (last 3 results):  No results for input(s): VANCOMYCINRA, VANCORANDOM, VANCOMYCINPE, VANCOPEAK, VANCOMYCINTR, VANCOTROUGH in the last 72 hours.    Microbiologic Results:  Microbiology Results (last 7 days)       Procedure Component Value Units Date/Time    MRSA/SA Rapid ID by PCR from Blood culture [[id number]] Collected: 11/06/22  1130    Order Status: No result Updated: 11/07/22 1008    Blood culture x two cultures. Draw prior to antibiotics. [[id number]] Collected: 11/06/22 1130    Order Status: Completed Specimen: Blood from Peripheral, Antecubital, Left Updated: 11/07/22 1007     Blood Culture, Routine Gram stain yaw bottle: Gram positive cocci in clusters resembling Staph      Results called to and read back by: Farzana Barrios RN 11/07/2022  10:05    Narrative:      Aerobic and anaerobic    Blood culture x two cultures. Draw prior to antibiotics. [[id number]] Collected: 11/06/22 1159    Order Status: Completed Specimen: Blood from Peripheral, Antecubital, Right Updated: 11/06/22 1945     Blood Culture, Routine No Growth to date    Narrative:      Aerobic and anaerobic    Culture, Respiratory with Gram Stain [[id number]]     Order Status: No result Specimen: Respiratory     Group A Strep, Molecular [[id number]] Collected: 11/06/22 1249    Order Status: Completed Specimen: Throat Updated: 11/06/22 1415     Group A Strep, Molecular Negative     Comment: Arcanobacterium haemolyticum and Beta Streptococcus group C   and G will not be detected by this test method.  Please order   Throat Culture ([id number]) if suspected.

## 2022-11-07 NOTE — PLAN OF CARE
[facility] - Observation  Discharge Assessment    Primary Care Provider: Pippa Avery MD     CM at bedside to discuss discharge planning assessment.   Patient lives with  in a one-story home.   Independent with ADL and does not use medical equipment.   Explained CM services during patient's stay in hospital.   My Health packet discussed and left with patient.     Discharge Assessment (most recent)       BRIEF DISCHARGE ASSESSMENT - 11/07/22 0473          Discharge Planning    Assessment Type Discharge Planning Brief Assessment     Resource/Environmental Concerns none     Support Systems Spouse/significant other     Equipment Currently Used at Home none     Current Living Arrangements home/apartment/condo     Patient/Family Anticipates Transition to home with family     Patient/Family Anticipated Services at Transition none     DME Needed Upon Discharge  --   TBD    Discharge Plan A Home with family     Discharge Plan B Home Health

## 2022-11-07 NOTE — ED TRIAGE NOTES
42F otherwise healthy with about 2 weeks of URI symptoms that progressed to fevers/sweats, cough and chest pain, abdominal pain and severe fatigue and aches / headache that prompted her to come in to the ED.  Sick contacts include son with flu A.  She reports at home OTC not helping .. She arrives febrile with HR 150s sinus tachy.  BP stable.  Lungs reveal no wheezing, +rhonci at bases, no dyspnea.  Abdomen tender throughout without peritonitic signs.  EKG with sinus tach no ischemia - trop neg, does not seem to be myocarditis secondary to viral illness at this point.  Was given IVF, pain control. CXR, CTAP, viral panel, cultures obtained and sent.  Lactate wnl.  The patient was covered with broad spectrum antibiotics up front due to the nature of her presentation and concerning vital signs as more objective information had yet to be found ... including the concern for intraabdominal process.  Results did include influenza A positive with RLL opacity c/w secondary pneumonia.  Vitals improved after IVF resuscitation, antipyretics.  She was admitted to medicine.

## 2022-11-07 NOTE — ASSESSMENT & PLAN NOTE
Patient with acute onset productive cough following flu-like illness, associated with N/V/D. RLL consolidation on imaging, leukocytosis, hyponatremia.    -continue broad spectrum abx  -f/u resp and repeat blood cx  -see 'sepsis'

## 2022-11-07 NOTE — HOSPITAL COURSE
Admitted for sepsis 2/2 pneumonia. Tested positive for influenza. Started CTX/azithro for CAP coverage. Added Vanc after blood cx with GPC in clusters in one bottle, discontinued after speciation showed likely contaminant. Repeat blood cultures were negative. Patients symptoms and illness likely caused by viral pneumonia from influenza. She improved greatly over hospital course, initially have symptoms of pain with breathing that became more manageable over time. Patient will need to schedule follow-up with her PCP for influenza c/b PNA. She can get flu-vaccine when she feels ready. Patient was HDS at the time of discharge without any oxygen requirements. Informed patient she will still have some symptoms from her PNA and can take a couple weeks before she is back to normal. She was advised that she can take melatonin for insomnia at home home. She will have follow up with her PCP who can request records from her stay at [facility] in [place].

## 2022-11-07 NOTE — PROGRESS NOTES
[facility] Medicine  Progress Note    Patient Name: Irene LEONARDO  MRN: [id number]  Patient Class: IP- Inpatient   Admission Date: 11/6/2022  Length of Stay: 0 days  Attending Physician: Ramón Bennett MD  Primary Care Provider: Primary Doctor No        Subjective:     Principal Problem:Sepsis        HPI:  Ms. Leonardo is a 42 y.o. F with PMH depression, gestational HTN who presents to the ED with fevers, chills, cough, nausea/vomiting. She reports that on 10/25, she woke with dizziness, fevers/chills, and nausea. She had some associated vomiting and diarrhea. Her symptoms started to improve, but then on Thursday 11/3 she developed cough, pleuritic chest pain, and feeling of 'drowning' in mucus. She also reports a temporal headache that feels like throbbing/stabbing pain when she coughs.    The patient has a son who tested positive for influenza a few days after patient's symptoms started. She reports no sick contacts prior to onset of her symptoms. She denies any episodes of aspiration. She reports having COVID-19 in January 2021. She has a remote 6-pack-year smoking history, quit ~20 years ago. No personal or family history of autoimmune dz or vasculitides.      In the ED, patient is febrile to 102.6, tachycardic to 151, /70, RR 28, saturating 97% on room air. Labs notable for leukocytosis to 13.3, hyponatremia to 133, lactate of 1.0. CXR shows opacity of R cardiophrenic angle. CT A/P shows consolidation of the RLL and an incidental L pleural nodule of 3mm. CTH with no acute abnormality. Patient received 2L LR, 1x vanc/zosyn in the ED. Admitted to medicine for further management.      Overview/Hospital Course:  Admitted for sepsis 2/2 pneumonia. Tested positive for influenza. Started CTX/azithro for CAP coverage. Added Vanc after blood cx with GPC in clusters in one bottle, pending repeat cultures.       Interval History: Patient reports feeling slightly worse today than in the ED  yesterday. Has pleuritic chest pain worse with cough/deep inspiration. Tearful and anxious about not getting better.    Review of Systems   Constitutional:  Positive for chills and fever.   HENT:  Negative for rhinorrhea and sore throat.    Eyes:  Negative for photophobia.   Respiratory:  Positive for cough and shortness of breath.    Cardiovascular:  Negative for chest pain and leg swelling.   Gastrointestinal:  Negative for nausea and vomiting.   Genitourinary:  Negative for dysuria.   Musculoskeletal:  Negative for arthralgias and myalgias.   Skin:  Negative for rash.   Neurological:  Positive for headaches. Negative for light-headedness.   Psychiatric/Behavioral:  Negative for agitation and confusion. The patient is nervous/anxious.    Objective:     Vital Signs (Most Recent):  Temp: 97.5 °F (36.4 °C) (11/07/22 1142)  Pulse: 81 (11/07/22 1142)  Resp: 18 (11/07/22 1142)  BP: 101/62 (11/07/22 1142)  SpO2: 96 % (11/07/22 1142) Vital Signs (24h Range):  Temp:  [97.5 °F (36.4 °C)-99.9 °F (37.7 °C)] 97.5 °F (36.4 °C)  Pulse:  [] 81  Resp:  [14-21] 18  SpO2:  [90 %-99 %] 96 %  BP: (101-134)/(62-77) 101/62     Weight: 63.5 kg (140 lb)  Body mass index is 25.61 kg/m².    Intake/Output Summary (Last 24 hours) at 11/7/2022 1346  Last data filed at 11/7/2022 1201  Gross per 24 hour   Intake 292.58 ml   Output 100 ml   Net 192.58 ml      Physical Exam  Constitutional:       Appearance: She is ill-appearing.   HENT:      Head: Normocephalic and atraumatic.      Mouth/Throat:      Mouth: Mucous membranes are moist.      Pharynx: Oropharynx is clear.   Eyes:      General: No scleral icterus.     Extraocular Movements: Extraocular movements intact.      Conjunctiva/sclera: Conjunctivae normal.   Cardiovascular:      Rate and Rhythm: Normal rate and regular rhythm.      Heart sounds: Normal heart sounds.   Pulmonary:      Effort: Pulmonary effort is normal.      Breath sounds: Wheezing and rales present.   Abdominal:       General: Abdomen is flat. Bowel sounds are normal.      Palpations: Abdomen is soft.      Tenderness: There is no abdominal tenderness.   Musculoskeletal:         General: No swelling. Normal range of motion.      Cervical back: Normal range of motion.   Skin:     General: Skin is warm and dry.   Neurological:      General: No focal deficit present.      Mental Status: She is alert.   Psychiatric:      Comments: Tearful, concerned about not feeling better       Significant Labs: All pertinent labs within the past 24 hours have been reviewed.    Significant Imaging: I have reviewed all pertinent imaging results/findings within the past 24 hours.      Assessment/Plan:      * Sepsis  41 yo F presents with acute worsening of flu-like illness characterized by cough, fevers/chills, headache, diarrhea. Leukocytosis to 13.3, hyponatremia to 133, CT A/P with RLL consolidation. S/p fluid resuscitation in ED. S/p vanc/zosyn x1 in ED. Blood cx with GPC in clusters x 1 bottle    This patient does have evidence of infective focus  My overall impression is sepsis. Vital signs were reviewed and noted in progress note.  Antibiotics given-   Antibiotics (From admission, onward)    Start     Stop Route Frequency Ordered    11/07/22 0100  vancomycin in dextrose 5 % 1 gram/250 mL IVPB 1,000 mg         -- IV Every 12 hours (non-standard times) 11/06/22 1351    11/06/22 1600  cefTRIAXone (ROCEPHIN) 2 g/50 mL D5W IVPB         -- IV Every 24 hours (non-standard times) 11/06/22 1349    11/06/22 1430  azithromycin tablet 500 mg         11/09 0859 Oral Daily 11/06/22 1323    11/06/22 1421  vancomycin - pharmacy to dose  (vancomycin IVPB)        See [facility] for full Linked Orders Report.    -- IV pharmacy to manage frequency 11/06/22 1323        Cultures were taken-   Microbiology Results (last 7 days)     Procedure Component Value Units Date/Time    Culture, Respiratory with Gram Stain [[id number]]     Order Status: No result Specimen:  Respiratory     Group A Strep, Molecular [[id number]] Collected: 11/06/22 1249    Order Status: Completed Specimen: Throat Updated: 11/06/22 1415     Group A Strep, Molecular Negative     Comment: Arcanobacterium haemolyticum and Beta Streptococcus group C   and G will not be detected by this test method.  Please order   Throat Culture ([id number]) if suspected.         Blood culture x two cultures. Draw prior to antibiotics. [[id number]] Collected: 11/06/22 1159    Order Status: Sent Specimen: Blood from Peripheral, Antecubital, Right Updated: 11/06/22 1246    Blood culture x two cultures. Draw prior to antibiotics. [[id number]] Collected: 11/06/22 1130    Order Status: Sent Specimen: Blood from Peripheral, Antecubital, Left Updated: 11/06/22 1156        Latest lactate reviewed, they are-  Recent Labs   Lab 11/06/22  1144   LACTATE 1.0       Source- RLL pneumonia    Plan:  -empiric vanc/ctx/azithro for broad spectrum CAP coverage and GPC in blood cx  -repeat blood cx pending  -f/u resp cx  -f/u legionella antigen  -PRN zofran, phenergan for N/V  -PRN tylenol for headaches      Depression          Hyponatremia  Mild hyponatremia to 133, patient without overt symptoms. Reports poor PO intake since onset of symptoms. S/p fluid resuscitation in ED.    -daily BMP  -encourage PO intake  -resolved      Pneumonia  Patient with acute onset productive cough following flu-like illness, associated with N/V/D. RLL consolidation on imaging, leukocytosis, hyponatremia.    -continue broad spectrum abx  -f/u resp and repeat blood cx  -see 'sepsis'        VTE Risk Mitigation (From admission, onward)         Ordered     IP VTE LOW RISK PATIENT  Once         11/06/22 1320     Place sequential compression device  Until discontinued         11/06/22 1320                Discharge Planning   JOEL: 11/7/2022     Code Status: Full Code   Is the patient medically ready for discharge?: No    Reason for patient still in hospital (select all that  apply): Patient trending condition and Treatment                     SANDIP HURD MD  Department of Hospital Medicine   [facility]

## 2022-11-07 NOTE — ASSESSMENT & PLAN NOTE
Mild hyponatremia to 133, patient without overt symptoms. Reports poor PO intake since onset of symptoms. S/p fluid resuscitation in ED.    -daily BMP  -encourage PO intake  -resolved

## 2022-11-07 NOTE — SUBJECTIVE & OBJECTIVE
Interval History: Patient reports feeling slightly worse today than in the ED yesterday. Has pleuritic chest pain worse with cough/deep inspiration. Tearful and anxious about not getting better.    Review of Systems   Constitutional:  Positive for chills and fever.   HENT:  Negative for rhinorrhea and sore throat.    Eyes:  Negative for photophobia.   Respiratory:  Positive for cough and shortness of breath.    Cardiovascular:  Negative for chest pain and leg swelling.   Gastrointestinal:  Negative for nausea and vomiting.   Genitourinary:  Negative for dysuria.   Musculoskeletal:  Negative for arthralgias and myalgias.   Skin:  Negative for rash.   Neurological:  Positive for headaches. Negative for light-headedness.   Psychiatric/Behavioral:  Negative for agitation and confusion. The patient is nervous/anxious.    Objective:     Vital Signs (Most Recent):  Temp: 97.5 °F (36.4 °C) (11/07/22 1142)  Pulse: 81 (11/07/22 1142)  Resp: 18 (11/07/22 1142)  BP: 101/62 (11/07/22 1142)  SpO2: 96 % (11/07/22 1142) Vital Signs (24h Range):  Temp:  [97.5 °F (36.4 °C)-99.9 °F (37.7 °C)] 97.5 °F (36.4 °C)  Pulse:  [] 81  Resp:  [14-21] 18  SpO2:  [90 %-99 %] 96 %  BP: (101-134)/(62-77) 101/62     Weight: 63.5 kg (140 lb)  Body mass index is 25.61 kg/m².    Intake/Output Summary (Last 24 hours) at 11/7/2022 1346  Last data filed at 11/7/2022 1201  Gross per 24 hour   Intake 292.58 ml   Output 100 ml   Net 192.58 ml      Physical Exam  Constitutional:       Appearance: She is ill-appearing.   HENT:      Head: Normocephalic and atraumatic.      Mouth/Throat:      Mouth: Mucous membranes are moist.      Pharynx: Oropharynx is clear.   Eyes:      General: No scleral icterus.     Extraocular Movements: Extraocular movements intact.      Conjunctiva/sclera: Conjunctivae normal.   Cardiovascular:      Rate and Rhythm: Normal rate and regular rhythm.      Heart sounds: Normal heart sounds.   Pulmonary:      Effort: Pulmonary effort  is normal.      Breath sounds: Wheezing and rales present.   Abdominal:      General: Abdomen is flat. Bowel sounds are normal.      Palpations: Abdomen is soft.      Tenderness: There is no abdominal tenderness.   Musculoskeletal:         General: No swelling. Normal range of motion.      Cervical back: Normal range of motion.   Skin:     General: Skin is warm and dry.   Neurological:      General: No focal deficit present.      Mental Status: She is alert.   Psychiatric:      Comments: Tearful, concerned about not feeling better       Significant Labs: All pertinent labs within the past 24 hours have been reviewed.    Significant Imaging: I have reviewed all pertinent imaging results/findings within the past 24 hours.

## 2022-11-07 NOTE — NURSING
Incentive Spirometer teaching initiated @ 0916.     Pt provided with sterile cup for sputum and urine; instructed on capture method for both.     Farzana Barrios RN

## 2022-11-07 NOTE — PLAN OF CARE
Problem: Adult Inpatient Plan of Care  Goal: Plan of Care Review  Outcome: Ongoing, Progressing  Flowsheets (Taken 11/7/2022 0352)  Plan of Care Reviewed With:   patient   spouse  Goal: Patient-Specific Goal (Individualized)  Outcome: Ongoing, Progressing  Goal: Absence of Hospital-Acquired Illness or Injury  Outcome: Ongoing, Progressing  Goal: Optimal Comfort and Wellbeing  Outcome: Ongoing, Progressing  Goal: Readiness for Transition of Care  Outcome: Ongoing, Progressing     Problem: Adjustment to Illness (Sepsis/Septic Shock)  Goal: Optimal Coping  Outcome: Ongoing, Progressing     Problem: Bleeding (Sepsis/Septic Shock)  Goal: Absence of Bleeding  Outcome: Ongoing, Progressing     Problem: Glycemic Control Impaired (Sepsis/Septic Shock)  Goal: Blood Glucose Level Within Desired Range  Outcome: Ongoing, Progressing     Problem: Infection Progression (Sepsis/Septic Shock)  Goal: Absence of Infection Signs and Symptoms  Outcome: Ongoing, Progressing     Problem: Nutrition Impaired (Sepsis/Septic Shock)  Goal: Optimal Nutrition Intake  Outcome: Ongoing, Progressing     Problem: Fluid Imbalance (Pneumonia)  Goal: Fluid Balance  Outcome: Ongoing, Progressing     Problem: Respiratory Compromise (Pneumonia)  Goal: Effective Oxygenation and Ventilation  Outcome: Ongoing, Progressing     Problem: Infection  Goal: Absence of Infection Signs and Symptoms  Outcome: Ongoing, Progressing

## 2022-11-08 LAB
ANION GAP SERPL CALC-SCNC: 9 MMOL/L (ref 8–16)
BASOPHILS # BLD AUTO: 0.03 K/UL (ref 0–0.2)
BASOPHILS NFR BLD: 0.4 % (ref 0–1.9)
BUN SERPL-MCNC: 5 MG/DL (ref 6–20)
CALCIUM SERPL-MCNC: 8.9 MG/DL (ref 8.7–10.5)
CHLORIDE SERPL-SCNC: 105 MMOL/L (ref 95–110)
CO2 SERPL-SCNC: 23 MMOL/L (ref 23–29)
CREAT SERPL-MCNC: 0.6 MG/DL (ref 0.5–1.4)
DIFFERENTIAL METHOD: ABNORMAL
EOSINOPHIL # BLD AUTO: 0.2 K/UL (ref 0–0.5)
EOSINOPHIL NFR BLD: 2.9 % (ref 0–8)
ERYTHROCYTE [DISTWIDTH] IN BLOOD BY AUTOMATED COUNT: 12.3 % (ref 11.5–14.5)
EST. GFR  (NO RACE VARIABLE): >60 ML/MIN/1.73 M^2
GLUCOSE SERPL-MCNC: 81 MG/DL (ref 70–110)
HCT VFR BLD AUTO: 33.9 % (ref 37–48.5)
HGB BLD-MCNC: 11.8 G/DL (ref 12–16)
IMM GRANULOCYTES # BLD AUTO: 0.05 K/UL (ref 0–0.04)
IMM GRANULOCYTES NFR BLD AUTO: 0.6 % (ref 0–0.5)
LYMPHOCYTES # BLD AUTO: 1.9 K/UL (ref 1–4.8)
LYMPHOCYTES NFR BLD: 24.6 % (ref 18–48)
MAGNESIUM SERPL-MCNC: 1.9 MG/DL (ref 1.6–2.6)
MCH RBC QN AUTO: 30.6 PG (ref 27–31)
MCHC RBC AUTO-ENTMCNC: 34.8 G/DL (ref 32–36)
MCV RBC AUTO: 88 FL (ref 82–98)
MONOCYTES # BLD AUTO: 0.6 K/UL (ref 0.3–1)
MONOCYTES NFR BLD: 7.1 % (ref 4–15)
NEUTROPHILS # BLD AUTO: 5.1 K/UL (ref 1.8–7.7)
NEUTROPHILS NFR BLD: 64.4 % (ref 38–73)
NRBC BLD-RTO: 0 /100 WBC
PHOSPHATE SERPL-MCNC: 3.8 MG/DL (ref 2.7–4.5)
PLATELET # BLD AUTO: 276 K/UL (ref 150–450)
PMV BLD AUTO: 10.3 FL (ref 9.2–12.9)
POTASSIUM SERPL-SCNC: 4.1 MMOL/L (ref 3.5–5.1)
RBC # BLD AUTO: 3.85 M/UL (ref 4–5.4)
SODIUM SERPL-SCNC: 137 MMOL/L (ref 136–145)
VANCOMYCIN TROUGH SERPL-MCNC: 7.2 UG/ML (ref 10–22)
WBC # BLD AUTO: 7.85 K/UL (ref 3.9–12.7)

## 2022-11-08 PROCEDURE — 36415 COLL VENOUS BLD VENIPUNCTURE: CPT | Performed by: STUDENT IN AN ORGANIZED HEALTH CARE EDUCATION/TRAINING PROGRAM

## 2022-11-08 PROCEDURE — 25000003 PHARM REV CODE 250: Performed by: STUDENT IN AN ORGANIZED HEALTH CARE EDUCATION/TRAINING PROGRAM

## 2022-11-08 PROCEDURE — 99233 SBSQ HOSP IP/OBS HIGH 50: CPT | Mod: ,,, | Performed by: STUDENT IN AN ORGANIZED HEALTH CARE EDUCATION/TRAINING PROGRAM

## 2022-11-08 PROCEDURE — 63700000 PHARM REV CODE 250 ALT 637 W/O HCPCS

## 2022-11-08 PROCEDURE — 99233 PR SUBSEQUENT HOSPITAL CARE,LEVL III: ICD-10-PCS | Mod: ,,, | Performed by: STUDENT IN AN ORGANIZED HEALTH CARE EDUCATION/TRAINING PROGRAM

## 2022-11-08 PROCEDURE — 11000001 HC ACUTE MED/SURG PRIVATE ROOM

## 2022-11-08 PROCEDURE — 36415 COLL VENOUS BLD VENIPUNCTURE: CPT

## 2022-11-08 PROCEDURE — 27000207 HC ISOLATION

## 2022-11-08 PROCEDURE — 80048 BASIC METABOLIC PNL TOTAL CA: CPT

## 2022-11-08 PROCEDURE — 83735 ASSAY OF MAGNESIUM: CPT

## 2022-11-08 PROCEDURE — 63600175 PHARM REV CODE 636 W HCPCS

## 2022-11-08 PROCEDURE — 25000003 PHARM REV CODE 250

## 2022-11-08 PROCEDURE — 25000003 PHARM REV CODE 250: Performed by: INTERNAL MEDICINE

## 2022-11-08 PROCEDURE — 85025 COMPLETE CBC W/AUTO DIFF WBC: CPT

## 2022-11-08 PROCEDURE — 84100 ASSAY OF PHOSPHORUS: CPT

## 2022-11-08 PROCEDURE — 80202 ASSAY OF VANCOMYCIN: CPT | Performed by: STUDENT IN AN ORGANIZED HEALTH CARE EDUCATION/TRAINING PROGRAM

## 2022-11-08 RX ORDER — LOPERAMIDE HYDROCHLORIDE 2 MG/1
2 CAPSULE ORAL 4 TIMES DAILY PRN
Status: DISCONTINUED | OUTPATIENT
Start: 2022-11-08 | End: 2022-11-09 | Stop reason: HOSPADM

## 2022-11-08 RX ADMIN — BENZONATATE 100 MG: 100 CAPSULE ORAL at 06:11

## 2022-11-08 RX ADMIN — GUAIFENESIN AND DEXTROMETHORPHAN HYDROBROMIDE 1 TABLET: 600; 30 TABLET, EXTENDED RELEASE ORAL at 06:11

## 2022-11-08 RX ADMIN — Medication 1 CAPSULE: at 12:11

## 2022-11-08 RX ADMIN — ONDANSETRON 4 MG: 2 INJECTION INTRAMUSCULAR; INTRAVENOUS at 10:11

## 2022-11-08 RX ADMIN — OXYCODONE 5 MG: 5 TABLET ORAL at 09:11

## 2022-11-08 RX ADMIN — LOPERAMIDE HYDROCHLORIDE 2 MG: 2 CAPSULE ORAL at 10:11

## 2022-11-08 RX ADMIN — ACETAMINOPHEN 650 MG: 325 TABLET ORAL at 10:11

## 2022-11-08 RX ADMIN — GUAIFENESIN AND DEXTROMETHORPHAN HYDROBROMIDE 1 TABLET: 600; 30 TABLET, EXTENDED RELEASE ORAL at 09:11

## 2022-11-08 RX ADMIN — AZITHROMYCIN MONOHYDRATE 500 MG: 250 TABLET ORAL at 08:11

## 2022-11-08 NOTE — PLAN OF CARE
Ella sent email to [facility] requesting pt be screened for Medicaid.    Araseli Berg LCSW  PRN

## 2022-11-08 NOTE — SUBJECTIVE & OBJECTIVE
Interval History: Patient feeling better today and reports cough is primarily in the evening and in the morning on waking.    Review of Systems   Constitutional:  Positive for fever (subjective). Negative for chills.   HENT:  Negative for rhinorrhea and sore throat.    Eyes:  Negative for photophobia and visual disturbance.   Respiratory:  Positive for cough. Negative for shortness of breath.    Cardiovascular:  Negative for chest pain and leg swelling.   Gastrointestinal:  Positive for diarrhea. Negative for nausea and vomiting.   Genitourinary:  Negative for dysuria.   Musculoskeletal:  Negative for arthralgias and myalgias.   Skin:  Negative for rash.   Neurological:  Positive for headaches. Negative for light-headedness.   Psychiatric/Behavioral:  Negative for agitation and confusion.    Objective:     Vital Signs (Most Recent):  Temp: 97.2 °F (36.2 °C) (11/08/22 1200)  Pulse: 98 (11/08/22 1200)  Resp: 20 (11/08/22 1200)  BP: 118/80 (11/08/22 1200)  SpO2: 97 % (11/08/22 1200) Vital Signs (24h Range):  Temp:  [96.8 °F (36 °C)-98.2 °F (36.8 °C)] 97.2 °F (36.2 °C)  Pulse:  [74-98] 98  Resp:  [16-20] 20  SpO2:  [95 %-99 %] 97 %  BP: (113-144)/(77-96) 118/80     Weight: 63.5 kg (140 lb)  Body mass index is 25.61 kg/m².    Intake/Output Summary (Last 24 hours) at 11/8/2022 1416  Last data filed at 11/7/2022 1818  Gross per 24 hour   Intake 300 ml   Output --   Net 300 ml      Physical Exam  HENT:      Head: Normocephalic and atraumatic.      Mouth/Throat:      Mouth: Mucous membranes are moist.      Pharynx: Oropharynx is clear.   Eyes:      General: No scleral icterus.     Extraocular Movements: Extraocular movements intact.      Conjunctiva/sclera: Conjunctivae normal.   Cardiovascular:      Rate and Rhythm: Normal rate and regular rhythm.   Pulmonary:      Effort: Pulmonary effort is normal.      Breath sounds: Wheezing (R middle/lower lung zone) and rales (R middle/lower lung zone) present.   Abdominal:       General: Abdomen is flat. Bowel sounds are normal. There is no distension.      Palpations: Abdomen is soft.      Tenderness: There is no abdominal tenderness.   Musculoskeletal:         General: Normal range of motion.      Cervical back: Normal range of motion.   Skin:     General: Skin is warm and dry.   Neurological:      General: No focal deficit present.      Mental Status: She is alert.   Psychiatric:         Mood and Affect: Mood normal.         Behavior: Behavior normal.       Significant Labs: All pertinent labs within the past 24 hours have been reviewed.    Significant Imaging: I have reviewed all pertinent imaging results/findings within the past 24 hours.

## 2022-11-08 NOTE — ASSESSMENT & PLAN NOTE
43 yo F presents with acute worsening of flu-like illness characterized by cough, fevers/chills, headache, diarrhea. Leukocytosis to 13.3, hyponatremia to 133, CT A/P with RLL consolidation. S/p fluid resuscitation in ED. S/p vanc/zosyn x1 in ED. Blood cx with GPC in clusters x 1 bottle, speciated to contaminant. Abx stopped, patient with clinical improvement.    This patient does have evidence of infective focus  My overall impression is sepsis. Vital signs were reviewed and noted in progress note.  Antibiotics given-   Antibiotics (From admission, onward)    None        Cultures were taken-   Microbiology Results (last 7 days)     Procedure Component Value Units Date/Time    Blood culture x two cultures. Draw prior to antibiotics. [[id number]] Collected: 11/06/22 1159    Order Status: Completed Specimen: Blood from Peripheral, Antecubital, Right Updated: 11/08/22 1412     Blood Culture, Routine No Growth to date      No Growth to date      No Growth to date    Narrative:      Aerobic and anaerobic    Blood culture x two cultures. Draw prior to antibiotics. [[id number]]  (Abnormal) Collected: 11/06/22 1130    Order Status: Completed Specimen: Blood from Peripheral, Antecubital, Left Updated: 11/08/22 1215     Blood Culture, Routine Gram stain yaw bottle: Gram positive cocci in clusters resembling Staph      Results called to and read back by: Farzana Barrios RN 11/07/2022  10:05      Gram stain aer bottle: Gram positive cocci in clusters resembling Staph      Results called to and read back previously  11/08/2022  02:00      COAGULASE-NEGATIVE STAPHYLOCOCCUS SPECIES  Organism is a probable contaminant      Narrative:      Aerobic and anaerobic    Blood culture [[id number]] Collected: 11/07/22 1433    Order Status: Completed Specimen: Blood Updated: 11/07/22 2145     Blood Culture, Routine No Growth to date    Blood culture [[id number]] Collected: 11/07/22 1433    Order Status: Completed Specimen: Blood Updated:  11/07/22 2145     Blood Culture, Routine No Growth to date    MRSA/SA Rapid ID by PCR from Blood culture [[id number]] Collected: 11/06/22 1130    Order Status: Completed Updated: 11/07/22 1213     Staph aureus ID by PCR Negative     MRSA ID by PCR Negative    Narrative:      Aerobic and anaerobic    Culture, Respiratory with Gram Stain [[id number]]     Order Status: No result Specimen: Respiratory     Group A Strep, Molecular [[id number]] Collected: 11/06/22 1249    Order Status: Completed Specimen: Throat Updated: 11/06/22 1415     Group A Strep, Molecular Negative     Comment: Arcanobacterium haemolyticum and Beta Streptococcus group C   and G will not be detected by this test method.  Please order   Throat Culture ([id number]) if suspected.             Latest lactate reviewed, they are-  Recent Labs   Lab 11/06/22  1144 11/06/22  1600   LACTATE 1.0 0.9       Source- RLL pneumonia    Plan:  -abx discontinued  -f/u resp cx  -f/u legionella antigen  -PRN zofran, phenergan for N/V  -PRN tylenol for headaches

## 2022-11-08 NOTE — ASSESSMENT & PLAN NOTE
Patient with acute onset productive cough following flu-like illness, associated with N/V/D. RLL consolidation on imaging, leukocytosis, hyponatremia. HypoNa resolved, pna improving.    -f/u resp cx  -see 'sepsis'

## 2022-11-08 NOTE — PROGRESS NOTES
[facility]  Progress Note    Patient Name: Irene LEONARDO  MRN: [id number]  Patient Class: IP- Inpatient   Admission Date: 11/6/2022  Length of Stay: 1 days  Attending Physician: Ramón Bennett MD  Primary Care Provider: Pippa Avery MD        Subjective:     Principal Problem:Sepsis        HPI:  Ms. Leonardo is a 42 y.o. F with PMH depression, gestational HTN who presents to the ED with fevers, chills, cough, nausea/vomiting. She reports that on 10/25, she woke with dizziness, fevers/chills, and nausea. She had some associated vomiting and diarrhea. Her symptoms started to improve, but then on Thursday 11/3 she developed cough, pleuritic chest pain, and feeling of 'drowning' in mucus. She also reports a temporal headache that feels like throbbing/stabbing pain when she coughs.    The patient has a son who tested positive for influenza a few days after patient's symptoms started. She reports no sick contacts prior to onset of her symptoms. She denies any episodes of aspiration. She reports having COVID-19 in January 2021. She has a remote 6-pack-year smoking history, quit ~20 years ago. No personal or family history of autoimmune dz or vasculitides.      In the ED, patient is febrile to 102.6, tachycardic to 151, /70, RR 28, saturating 97% on room air. Labs notable for leukocytosis to 13.3, hyponatremia to 133, lactate of 1.0. CXR shows opacity of R cardiophrenic angle. CT A/P shows consolidation of the RLL and an incidental L pleural nodule of 3mm. CTH with no acute abnormality. Patient received 2L LR, 1x vanc/zosyn in the ED. Admitted to medicine for further management.      Overview/Hospital Course:  Admitted for sepsis 2/2 pneumonia. Tested positive for influenza. Started CTX/azithro for CAP coverage. Added Vanc after blood cx with GPC in clusters in one bottle, discontinued after speciation showed likely contaminant. Patient will need follow-up for influenza c/b pna.        Interval History: Patient feeling better today and reports cough is primarily in the evening and in the morning on waking.    Review of Systems   Constitutional:  Positive for fever (subjective). Negative for chills.   HENT:  Negative for rhinorrhea and sore throat.    Eyes:  Negative for photophobia and visual disturbance.   Respiratory:  Positive for cough. Negative for shortness of breath.    Cardiovascular:  Negative for chest pain and leg swelling.   Gastrointestinal:  Positive for diarrhea. Negative for nausea and vomiting.   Genitourinary:  Negative for dysuria.   Musculoskeletal:  Negative for arthralgias and myalgias.   Skin:  Negative for rash.   Neurological:  Positive for headaches. Negative for light-headedness.   Psychiatric/Behavioral:  Negative for agitation and confusion.    Objective:     Vital Signs (Most Recent):  Temp: 97.2 °F (36.2 °C) (11/08/22 1200)  Pulse: 98 (11/08/22 1200)  Resp: 20 (11/08/22 1200)  BP: 118/80 (11/08/22 1200)  SpO2: 97 % (11/08/22 1200) Vital Signs (24h Range):  Temp:  [96.8 °F (36 °C)-98.2 °F (36.8 °C)] 97.2 °F (36.2 °C)  Pulse:  [74-98] 98  Resp:  [16-20] 20  SpO2:  [95 %-99 %] 97 %  BP: (113-144)/(77-96) 118/80     Weight: 63.5 kg (140 lb)  Body mass index is 25.61 kg/m².    Intake/Output Summary (Last 24 hours) at 11/8/2022 1416  Last data filed at 11/7/2022 1818  Gross per 24 hour   Intake 300 ml   Output --   Net 300 ml      Physical Exam  HENT:      Head: Normocephalic and atraumatic.      Mouth/Throat:      Mouth: Mucous membranes are moist.      Pharynx: Oropharynx is clear.   Eyes:      General: No scleral icterus.     Extraocular Movements: Extraocular movements intact.      Conjunctiva/sclera: Conjunctivae normal.   Cardiovascular:      Rate and Rhythm: Normal rate and regular rhythm.   Pulmonary:      Effort: Pulmonary effort is normal.      Breath sounds: Wheezing (R middle/lower lung zone) and rales (R middle/lower lung zone) present.   Abdominal:       General: Abdomen is flat. Bowel sounds are normal. There is no distension.      Palpations: Abdomen is soft.      Tenderness: There is no abdominal tenderness.   Musculoskeletal:         General: Normal range of motion.      Cervical back: Normal range of motion.   Skin:     General: Skin is warm and dry.   Neurological:      General: No focal deficit present.      Mental Status: She is alert.   Psychiatric:         Mood and Affect: Mood normal.         Behavior: Behavior normal.       Significant Labs: All pertinent labs within the past 24 hours have been reviewed.    Significant Imaging: I have reviewed all pertinent imaging results/findings within the past 24 hours.      Assessment/Plan:      * Sepsis  41 yo F presents with acute worsening of flu-like illness characterized by cough, fevers/chills, headache, diarrhea. Leukocytosis to 13.3, hyponatremia to 133, CT A/P with RLL consolidation. S/p fluid resuscitation in ED. S/p vanc/zosyn x1 in ED. Blood cx with GPC in clusters x 1 bottle, speciated to contaminant. Abx stopped, patient with clinical improvement.    This patient does have evidence of infective focus  My overall impression is sepsis. Vital signs were reviewed and noted in progress note.  Antibiotics given-   Antibiotics (From admission, onward)    None        Cultures were taken-   Microbiology Results (last 7 days)     Procedure Component Value Units Date/Time    Blood culture x two cultures. Draw prior to antibiotics. [[id number]] Collected: 11/06/22 1159    Order Status: Completed Specimen: Blood from Peripheral, Antecubital, Right Updated: 11/08/22 1412     Blood Culture, Routine No Growth to date      No Growth to date      No Growth to date    Narrative:      Aerobic and anaerobic    Blood culture x two cultures. Draw prior to antibiotics. [[id number]]  (Abnormal) Collected: 11/06/22 1130    Order Status: Completed Specimen: Blood from Peripheral, Antecubital, Left Updated: 11/08/22 1215     Blood  Culture, Routine Gram stain yaw bottle: Gram positive cocci in clusters resembling Staph      Results called to and read back by: Farzana Barrios RN 11/07/2022  10:05      Gram stain aer bottle: Gram positive cocci in clusters resembling Staph      Results called to and read back previously  11/08/2022  02:00      COAGULASE-NEGATIVE STAPHYLOCOCCUS SPECIES  Organism is a probable contaminant      Narrative:      Aerobic and anaerobic    Blood culture [[id number]] Collected: 11/07/22 1433    Order Status: Completed Specimen: Blood Updated: 11/07/22 2145     Blood Culture, Routine No Growth to date    Blood culture [[id number]] Collected: 11/07/22 1433    Order Status: Completed Specimen: Blood Updated: 11/07/22 2145     Blood Culture, Routine No Growth to date    MRSA/SA Rapid ID by PCR from Blood culture [[id number]] Collected: 11/06/22 1130    Order Status: Completed Updated: 11/07/22 1213     Staph aureus ID by PCR Negative     MRSA ID by PCR Negative    Narrative:      Aerobic and anaerobic    Culture, Respiratory with Gram Stain [[id number]]     Order Status: No result Specimen: Respiratory     Group A Strep, Molecular [[id number]] Collected: 11/06/22 1249    Order Status: Completed Specimen: Throat Updated: 11/06/22 1415     Group A Strep, Molecular Negative     Comment: Arcanobacterium haemolyticum and Beta Streptococcus group C   and G will not be detected by this test method.  Please order   Throat Culture ([id number]) if suspected.             Latest lactate reviewed, they are-  Recent Labs   Lab 11/06/22  1144 11/06/22  1600   LACTATE 1.0 0.9       Source- RLL pneumonia    Plan:  -abx discontinued  -f/u resp cx  -f/u legionella antigen  -PRN zofran, phenergan for N/V  -PRN tylenol for headaches      Depression          Hyponatremia  Mild hyponatremia to 133, patient without overt symptoms. Reports poor PO intake since onset of symptoms. S/p fluid resuscitation in ED.    -daily BMP  -encourage PO  intake  -resolved      Pneumonia  Patient with acute onset productive cough following flu-like illness, associated with N/V/D. RLL consolidation on imaging, leukocytosis, hyponatremia. HypoNa resolved, pna improving.    -f/u resp cx  -see 'sepsis'        VTE Risk Mitigation (From admission, onward)         Ordered     IP VTE LOW RISK PATIENT  Once         11/06/22 1320     Place sequential compression device  Until discontinued         11/06/22 1320                Discharge Planning   JOEL: 11/9/2022     Code Status: Full Code   Is the patient medically ready for discharge?: No    Reason for patient still in hospital (select all that apply): Patient trending condition  Discharge Plan A: Home with family                  SANDIP HURD MD  Department of Hospital Medicine   [facility]

## 2022-11-09 VITALS
WEIGHT: 140 LBS | HEART RATE: 93 BPM | SYSTOLIC BLOOD PRESSURE: 131 MMHG | BODY MASS INDEX: 25.76 KG/M2 | RESPIRATION RATE: 18 BRPM | HEIGHT: 62 IN | TEMPERATURE: 98 F | DIASTOLIC BLOOD PRESSURE: 86 MMHG | OXYGEN SATURATION: 95 %

## 2022-11-09 PROBLEM — A41.9 SEPSIS: Status: RESOLVED | Noted: 2022-11-06 | Resolved: 2022-11-09

## 2022-11-09 LAB
BACTERIA BLD CULT: ABNORMAL
L PNEUMO AG UR QL IA: NEGATIVE

## 2022-11-09 PROCEDURE — 99239 HOSP IP/OBS DSCHRG MGMT >30: CPT | Mod: ,,, | Performed by: STUDENT IN AN ORGANIZED HEALTH CARE EDUCATION/TRAINING PROGRAM

## 2022-11-09 PROCEDURE — 99239 PR HOSPITAL DISCHARGE DAY,>30 MIN: ICD-10-PCS | Mod: ,,, | Performed by: STUDENT IN AN ORGANIZED HEALTH CARE EDUCATION/TRAINING PROGRAM

## 2022-11-09 PROCEDURE — 25000003 PHARM REV CODE 250: Performed by: STUDENT IN AN ORGANIZED HEALTH CARE EDUCATION/TRAINING PROGRAM

## 2022-11-09 PROCEDURE — 25000003 PHARM REV CODE 250

## 2022-11-09 PROCEDURE — 25000242 PHARM REV CODE 250 ALT 637 W/ HCPCS

## 2022-11-09 RX ORDER — BENZONATATE 200 MG/1
200 CAPSULE ORAL 3 TIMES DAILY PRN
Qty: 12 CAPSULE | Refills: 0 | Status: SHIPPED | OUTPATIENT
Start: 2022-11-09

## 2022-11-09 RX ADMIN — BENZONATATE 100 MG: 100 CAPSULE ORAL at 09:11

## 2022-11-09 RX ADMIN — PSYLLIUM HUSK 1 PACKET: 3.4 POWDER ORAL at 09:11

## 2022-11-09 RX ADMIN — Medication 1 CAPSULE: at 09:11

## 2022-11-09 NOTE — PLAN OF CARE
Problem: Adult Inpatient Plan of Care  Goal: Plan of Care Review  Outcome: Met  Goal: Patient-Specific Goal (Individualized)  Outcome: Met  Goal: Absence of Hospital-Acquired Illness or Injury  Outcome: Met  Goal: Optimal Comfort and Wellbeing  Outcome: Met  Goal: Readiness for Transition of Care  Outcome: Met     Problem: Adjustment to Illness (Sepsis/Septic Shock)  Goal: Optimal Coping  Outcome: Met     Problem: Bleeding (Sepsis/Septic Shock)  Goal: Absence of Bleeding  Outcome: Met     Problem: Glycemic Control Impaired (Sepsis/Septic Shock)  Goal: Blood Glucose Level Within Desired Range  Outcome: Met     Problem: Infection Progression (Sepsis/Septic Shock)  Goal: Absence of Infection Signs and Symptoms  Outcome: Met     Problem: Nutrition Impaired (Sepsis/Septic Shock)  Goal: Optimal Nutrition Intake  Outcome: Met     Problem: Fluid Imbalance (Pneumonia)  Goal: Fluid Balance  Outcome: Met     Problem: Infection (Pneumonia)  Goal: Resolution of Infection Signs and Symptoms  Outcome: Met     Problem: Respiratory Compromise (Pneumonia)  Goal: Effective Oxygenation and Ventilation  Outcome: Met     Problem: Infection  Goal: Absence of Infection Signs and Symptoms  Outcome: Met      07-Nov-2020 01:15

## 2022-11-09 NOTE — DISCHARGE SUMMARY
[facility]  Discharge Summary      Patient Name: Irene MERINO  MRN: [id number]  ISAÍAS: [id number]  Patient Class: IP- Inpatient  Admission Date: 11/6/2022  Hospital Length of Stay: 2 days  Discharge Date and Time:  11/09/2022 12:18 PM  Attending Physician: Ramón Bennett MD   Discharging Provider: Glenn An MD  Primary Care Provider: Pippa Avery MD  [facility] Medicine Team: [facility] MED 1 Glenn nA MD  Primary Care Team: [facility] 1    HPI:   Ms. Merino is a 42 y.o. F with PMH depression, gestational HTN who presents to the ED with fevers, chills, cough, nausea/vomiting. She reports that on 10/25, she woke with dizziness, fevers/chills, and nausea. She had some associated vomiting and diarrhea. Her symptoms started to improve, but then on Thursday 11/3 she developed cough, pleuritic chest pain, and feeling of 'drowning' in mucus. She also reports a temporal headache that feels like throbbing/stabbing pain when she coughs.    The patient has a son who tested positive for influenza a few days after patient's symptoms started. She reports no sick contacts prior to onset of her symptoms. She denies any episodes of aspiration. She reports having COVID-19 in January 2021. She has a remote 6-pack-year smoking history, quit ~20 years ago. No personal or family history of autoimmune dz or vasculitides.      In the ED, patient is febrile to 102.6, tachycardic to 151, /70, RR 28, saturating 97% on room air. Labs notable for leukocytosis to 13.3, hyponatremia to 133, lactate of 1.0. CXR shows opacity of R cardiophrenic angle. CT A/P shows consolidation of the RLL and an incidental L pleural nodule of 3mm. CTH with no acute abnormality. Patient received 2L LR, 1x vanc/zosyn in the ED. Admitted to medicine for further management.      * No surgery found *      Hospital Course:   Admitted for sepsis 2/2 pneumonia. Tested positive for influenza. Started  CTX/azithro for CAP coverage. Added Vanc after blood cx with GPC in clusters in one bottle.  All antibiotics discontinued after speciation showed likely contaminant. Repeat blood cultures were negative. Patients symptoms and illness likely caused by viral pneumonia from influenza. She improved greatly over hospital course, initially have symptoms of pain with breathing that became more manageable over time. Patient will need to schedule follow-up with her PCP for influenza c/b PNA. She can get flu-vaccine when she feels ready. Patient was HDS at the time of discharge without any oxygen requirements. Informed patient she will still have some symptoms from her PNA and can take a couple weeks before she is back to normal. She was advised that she can take melatonin for insomnia at home home. She will have follow up with her PCP who can request records from her stay at [facility] in [place].       Goals of Care Treatment Preferences:  Code Status: Full Code      Consults:     No new Assessment & Plan notes have been filed under this hospital service since the last note was generated.  Service: Hospital Medicine    Final Active Diagnoses:    Diagnosis Date Noted POA    Pneumonia [J18.9] 11/06/2022 Yes    Hyponatremia [E87.1] 11/06/2022 Yes    Depression [F32.A] 11/06/2022 Yes      Problems Resolved During this Admission:    Diagnosis Date Noted Date Resolved POA    PRINCIPAL PROBLEM:  Sepsis [A41.9] 11/06/2022 11/09/2022 Yes       Discharged Condition: stable    Disposition: Home or Self Care    Follow Up:   Follow-up Information       Pippa Avery MD. Schedule an appointment as soon as possible for a visit in 1 week(s).    Specialty: Internal Medicine  Why: For hospital follow up  Contact information:  [address] [id number] [phone]                           Patient Instructions:      Diet Adult Regular       Significant Diagnostic Studies: none    Pending Diagnostic  Studies:       Procedure Component Value Units Date/Time    Basic metabolic panel [[id number]]     Order Status: Sent Lab Status: No result     Specimen: Blood     CBC Auto Differential [[id number]]     Order Status: Sent Lab Status: No result     Specimen: Blood     Magnesium [[id number]]     Order Status: Sent Lab Status: No result     Specimen: Blood     Phosphorus [[id number]]     Order Status: Sent Lab Status: No result     Specimen: Blood     Streptococcus pneumoniae IgG Antibody (23 Serotypes), MAID [[id number]]     Order Status: Sent Lab Status: No result     Specimen: Blood            Medications:  Reconciled Home Medications:      Medication List        START taking these medications      benzonatate 200 MG capsule  Commonly known as: TESSALON  Take 1 capsule (200 mg total) by mouth 3 (three) times daily as needed for Cough.              Indwelling Lines/Drains at time of discharge:   Lines/Drains/Airways       None                   Time spent on the discharge of patient: 35 minutes         Glenn An MD  Department of Hospital Medicine  [facility]

## 2022-11-09 NOTE — PLAN OF CARE
[facility]  Discharge Final Note    Primary Care Provider: Pippa Avery MD    Expected Discharge Date: 11/9/2022    Final Discharge Note (most recent)       Final Note - 11/09/22 1210          Final Note    Assessment Type Final Discharge Note     Anticipated Discharge Disposition Home or Self Care     What phone number can be called within the next 1-3 days to see how you are doing after discharge? [phone]     Hospital Resources/Appts/Education Provided Provided patient/caregiver with written discharge plan information        Post-Acute Status    Discharge Delays None known at this time                     Contact Info       Pippa Avery MD   Specialty: Internal Medicine   Relationship: PCP - General    [address] [place], [place] [address]   Phone: [phone]       Next Steps: Schedule an appointment as soon as possible for a visit in 1 week(s)    Instructions: For hospital follow up          Pt discharging home. Pt is self-pay and will make her own PCP appointment, information is in her AVS. Pt has no other post acute needs and is cleared for discharge from a case management standpoint.     YAO Perry, VI  [facility]  [id number]

## 2022-11-11 LAB — BACTERIA BLD CULT: NORMAL

## 2022-11-12 LAB
BACTERIA BLD CULT: NORMAL
BACTERIA BLD CULT: NORMAL

## 2023-02-13 PROBLEM — J18.9 PNEUMONIA: Status: RESOLVED | Noted: 2022-11-06 | Resolved: 2023-02-13
